# Patient Record
Sex: FEMALE | Race: BLACK OR AFRICAN AMERICAN | NOT HISPANIC OR LATINO | Employment: UNEMPLOYED | URBAN - METROPOLITAN AREA
[De-identification: names, ages, dates, MRNs, and addresses within clinical notes are randomized per-mention and may not be internally consistent; named-entity substitution may affect disease eponyms.]

---
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
## 2025-07-01 ENCOUNTER — HOSPITAL ENCOUNTER (INPATIENT)
Facility: OTHER | Age: 25
LOS: 4 days | Discharge: HOME HEALTH | DRG: 747 | End: 2025-07-06
Attending: EMERGENCY MEDICINE | Admitting: STUDENT IN AN ORGANIZED HEALTH CARE EDUCATION/TRAINING PROGRAM
Payer: COMMERCIAL

## 2025-07-01 ENCOUNTER — ANESTHESIA EVENT (OUTPATIENT)
Dept: SURGERY | Facility: OTHER | Age: 25
End: 2025-07-01
Payer: COMMERCIAL

## 2025-07-01 ENCOUNTER — ANESTHESIA (OUTPATIENT)
Dept: SURGERY | Facility: OTHER | Age: 25
End: 2025-07-01
Payer: COMMERCIAL

## 2025-07-01 DIAGNOSIS — N76.4 ABSCESS OF LABIA MAJORA: ICD-10-CM

## 2025-07-01 DIAGNOSIS — N76.4 VULVAR ABSCESS: Primary | ICD-10-CM

## 2025-07-01 DIAGNOSIS — R00.0 TACHYCARDIA: ICD-10-CM

## 2025-07-01 DIAGNOSIS — N76.4 VULVAR ABSCESS: ICD-10-CM

## 2025-07-01 DIAGNOSIS — N76.2 VULVAR CELLULITIS: ICD-10-CM

## 2025-07-01 PROBLEM — F39 MOOD DISORDER: Status: ACTIVE | Noted: 2025-07-01

## 2025-07-01 LAB
ABORH RETYPE: NORMAL
ABSOLUTE EOSINOPHIL (OHS): 0 K/UL
ABSOLUTE MONOCYTE (OHS): 0.95 K/UL (ref 0.3–1)
ABSOLUTE NEUTROPHIL COUNT (OHS): 12.13 K/UL (ref 1.8–7.7)
ALBUMIN SERPL BCP-MCNC: 3.7 G/DL (ref 3.5–5.2)
ALP SERPL-CCNC: 56 UNIT/L (ref 40–150)
ALT SERPL W/O P-5'-P-CCNC: 13 UNIT/L (ref 10–44)
ANION GAP (OHS): 11 MMOL/L (ref 8–16)
AST SERPL-CCNC: 18 UNIT/L (ref 11–45)
BASOPHILS # BLD AUTO: 0.02 K/UL
BASOPHILS NFR BLD AUTO: 0.1 %
BILIRUB SERPL-MCNC: 0.5 MG/DL (ref 0.1–1)
BUN SERPL-MCNC: 8 MG/DL (ref 6–20)
CALCIUM SERPL-MCNC: 9.8 MG/DL (ref 8.7–10.5)
CHLORIDE SERPL-SCNC: 101 MMOL/L (ref 95–110)
CO2 SERPL-SCNC: 22 MMOL/L (ref 23–29)
CREAT SERPL-MCNC: 0.8 MG/DL (ref 0.5–1.4)
ERYTHROCYTE [DISTWIDTH] IN BLOOD BY AUTOMATED COUNT: 12.5 % (ref 11.5–14.5)
GFR SERPLBLD CREATININE-BSD FMLA CKD-EPI: >60 ML/MIN/1.73/M2
GLUCOSE SERPL-MCNC: 92 MG/DL (ref 70–110)
HCT VFR BLD AUTO: 41.4 % (ref 37–48.5)
HCV AB SERPL QL IA: NEGATIVE
HGB BLD-MCNC: 13.7 GM/DL (ref 12–16)
HIV 1+2 AB+HIV1 P24 AG SERPL QL IA: NEGATIVE
HOLD SPECIMEN: 1
IMM GRANULOCYTES # BLD AUTO: 0.07 K/UL (ref 0–0.04)
IMM GRANULOCYTES NFR BLD AUTO: 0.5 % (ref 0–0.5)
INDIRECT COOMBS: NORMAL
LACTATE SERPL-SCNC: 1.6 MMOL/L (ref 0.5–2.2)
LYMPHOCYTES # BLD AUTO: 0.84 K/UL (ref 1–4.8)
MCH RBC QN AUTO: 30.6 PG (ref 27–31)
MCHC RBC AUTO-ENTMCNC: 33.1 G/DL (ref 32–36)
MCV RBC AUTO: 93 FL (ref 82–98)
NUCLEATED RBC (/100WBC) (OHS): 0 /100 WBC
PLATELET # BLD AUTO: 150 K/UL (ref 150–450)
PMV BLD AUTO: 9.2 FL (ref 9.2–12.9)
POTASSIUM SERPL-SCNC: 4.1 MMOL/L (ref 3.5–5.1)
PROT SERPL-MCNC: 7.8 GM/DL (ref 6–8.4)
RBC # BLD AUTO: 4.47 M/UL (ref 4–5.4)
RELATIVE EOSINOPHIL (OHS): 0 %
RELATIVE LYMPHOCYTE (OHS): 6 % (ref 18–48)
RELATIVE MONOCYTE (OHS): 6.8 % (ref 4–15)
RELATIVE NEUTROPHIL (OHS): 86.6 % (ref 38–73)
RH BLD: NORMAL
SODIUM SERPL-SCNC: 134 MMOL/L (ref 136–145)
SPECIMEN OUTDATE: NORMAL
WBC # BLD AUTO: 14.01 K/UL (ref 3.9–12.7)

## 2025-07-01 PROCEDURE — 0U9M0ZZ DRAINAGE OF VULVA, OPEN APPROACH: ICD-10-PCS | Performed by: STUDENT IN AN ORGANIZED HEALTH CARE EDUCATION/TRAINING PROGRAM

## 2025-07-01 PROCEDURE — 63600175 PHARM REV CODE 636 W HCPCS: Performed by: NURSE PRACTITIONER

## 2025-07-01 PROCEDURE — 25000003 PHARM REV CODE 250

## 2025-07-01 PROCEDURE — G0378 HOSPITAL OBSERVATION PER HR: HCPCS

## 2025-07-01 PROCEDURE — 81515 NFCT DS BV&VAGINITIS DNA ALG: CPT | Performed by: NURSE PRACTITIONER

## 2025-07-01 PROCEDURE — 37000009 HC ANESTHESIA EA ADD 15 MINS: Performed by: STUDENT IN AN ORGANIZED HEALTH CARE EDUCATION/TRAINING PROGRAM

## 2025-07-01 PROCEDURE — 96365 THER/PROPH/DIAG IV INF INIT: CPT

## 2025-07-01 PROCEDURE — 25000003 PHARM REV CODE 250: Performed by: ANESTHESIOLOGY

## 2025-07-01 PROCEDURE — 85025 COMPLETE CBC W/AUTO DIFF WBC: CPT | Performed by: NURSE PRACTITIONER

## 2025-07-01 PROCEDURE — 87491 CHLMYD TRACH DNA AMP PROBE: CPT | Performed by: NURSE PRACTITIONER

## 2025-07-01 PROCEDURE — 87389 HIV-1 AG W/HIV-1&-2 AB AG IA: CPT | Performed by: EMERGENCY MEDICINE

## 2025-07-01 PROCEDURE — 63600175 PHARM REV CODE 636 W HCPCS: Performed by: ANESTHESIOLOGY

## 2025-07-01 PROCEDURE — 71000033 HC RECOVERY, INTIAL HOUR: Performed by: STUDENT IN AN ORGANIZED HEALTH CARE EDUCATION/TRAINING PROGRAM

## 2025-07-01 PROCEDURE — 87075 CULTR BACTERIA EXCEPT BLOOD: CPT | Performed by: STUDENT IN AN ORGANIZED HEALTH CARE EDUCATION/TRAINING PROGRAM

## 2025-07-01 PROCEDURE — 87070 CULTURE OTHR SPECIMN AEROBIC: CPT | Performed by: STUDENT IN AN ORGANIZED HEALTH CARE EDUCATION/TRAINING PROGRAM

## 2025-07-01 PROCEDURE — 71000039 HC RECOVERY, EACH ADD'L HOUR: Performed by: STUDENT IN AN ORGANIZED HEALTH CARE EDUCATION/TRAINING PROGRAM

## 2025-07-01 PROCEDURE — 96375 TX/PRO/DX INJ NEW DRUG ADDON: CPT

## 2025-07-01 PROCEDURE — 99223 1ST HOSP IP/OBS HIGH 75: CPT | Mod: 57,25,, | Performed by: STUDENT IN AN ORGANIZED HEALTH CARE EDUCATION/TRAINING PROGRAM

## 2025-07-01 PROCEDURE — 63600175 PHARM REV CODE 636 W HCPCS: Performed by: NURSE ANESTHETIST, CERTIFIED REGISTERED

## 2025-07-01 PROCEDURE — 86803 HEPATITIS C AB TEST: CPT | Performed by: EMERGENCY MEDICINE

## 2025-07-01 PROCEDURE — 86901 BLOOD TYPING SEROLOGIC RH(D): CPT | Performed by: NURSE PRACTITIONER

## 2025-07-01 PROCEDURE — 25000003 PHARM REV CODE 250: Performed by: NURSE PRACTITIONER

## 2025-07-01 PROCEDURE — 87040 BLOOD CULTURE FOR BACTERIA: CPT | Performed by: NURSE PRACTITIONER

## 2025-07-01 PROCEDURE — 25000003 PHARM REV CODE 250: Performed by: NURSE ANESTHETIST, CERTIFIED REGISTERED

## 2025-07-01 PROCEDURE — 36000704 HC OR TIME LEV I 1ST 15 MIN: Performed by: STUDENT IN AN ORGANIZED HEALTH CARE EDUCATION/TRAINING PROGRAM

## 2025-07-01 PROCEDURE — 63600175 PHARM REV CODE 636 W HCPCS

## 2025-07-01 PROCEDURE — 83605 ASSAY OF LACTIC ACID: CPT | Performed by: NURSE PRACTITIONER

## 2025-07-01 PROCEDURE — 25000003 PHARM REV CODE 250: Performed by: STUDENT IN AN ORGANIZED HEALTH CARE EDUCATION/TRAINING PROGRAM

## 2025-07-01 PROCEDURE — 37000008 HC ANESTHESIA 1ST 15 MINUTES: Performed by: STUDENT IN AN ORGANIZED HEALTH CARE EDUCATION/TRAINING PROGRAM

## 2025-07-01 PROCEDURE — 25500020 PHARM REV CODE 255: Performed by: EMERGENCY MEDICINE

## 2025-07-01 PROCEDURE — 99285 EMERGENCY DEPT VISIT HI MDM: CPT | Mod: 25

## 2025-07-01 PROCEDURE — 63600175 PHARM REV CODE 636 W HCPCS: Performed by: STUDENT IN AN ORGANIZED HEALTH CARE EDUCATION/TRAINING PROGRAM

## 2025-07-01 PROCEDURE — 82947 ASSAY GLUCOSE BLOOD QUANT: CPT | Performed by: NURSE PRACTITIONER

## 2025-07-01 PROCEDURE — 56405 I&D VULVA/PERINEAL ABSCESS: CPT | Mod: ,,, | Performed by: STUDENT IN AN ORGANIZED HEALTH CARE EDUCATION/TRAINING PROGRAM

## 2025-07-01 PROCEDURE — 36000705 HC OR TIME LEV I EA ADD 15 MIN: Performed by: STUDENT IN AN ORGANIZED HEALTH CARE EDUCATION/TRAINING PROGRAM

## 2025-07-01 RX ORDER — DEXMEDETOMIDINE HYDROCHLORIDE 100 UG/ML
INJECTION, SOLUTION INTRAVENOUS
Status: DISCONTINUED | OUTPATIENT
Start: 2025-07-01 | End: 2025-07-01

## 2025-07-01 RX ORDER — ACETAMINOPHEN 10 MG/ML
INJECTION, SOLUTION INTRAVENOUS
Status: DISCONTINUED | OUTPATIENT
Start: 2025-07-01 | End: 2025-07-01

## 2025-07-01 RX ORDER — LIDOCAINE HYDROCHLORIDE 20 MG/ML
INJECTION INTRAVENOUS
Status: DISCONTINUED | OUTPATIENT
Start: 2025-07-01 | End: 2025-07-01

## 2025-07-01 RX ORDER — LURASIDONE HYDROCHLORIDE 40 MG/1
40 TABLET, FILM COATED ORAL DAILY
COMMUNITY

## 2025-07-01 RX ORDER — SODIUM CHLORIDE 0.9 % (FLUSH) 0.9 %
10 SYRINGE (ML) INJECTION
Status: DISCONTINUED | OUTPATIENT
Start: 2025-07-01 | End: 2025-07-01

## 2025-07-01 RX ORDER — PROCHLORPERAZINE EDISYLATE 5 MG/ML
5 INJECTION INTRAMUSCULAR; INTRAVENOUS EVERY 30 MIN PRN
Status: DISCONTINUED | OUTPATIENT
Start: 2025-07-01 | End: 2025-07-01 | Stop reason: HOSPADM

## 2025-07-01 RX ORDER — ONDANSETRON 8 MG/1
8 TABLET, ORALLY DISINTEGRATING ORAL EVERY 8 HOURS PRN
Status: DISCONTINUED | OUTPATIENT
Start: 2025-07-01 | End: 2025-07-01

## 2025-07-01 RX ORDER — MUPIROCIN 20 MG/G
OINTMENT TOPICAL
Status: CANCELLED | OUTPATIENT
Start: 2025-07-01

## 2025-07-01 RX ORDER — ONDANSETRON HYDROCHLORIDE 2 MG/ML
4 INJECTION, SOLUTION INTRAVENOUS DAILY PRN
Status: DISCONTINUED | OUTPATIENT
Start: 2025-07-01 | End: 2025-07-01 | Stop reason: HOSPADM

## 2025-07-01 RX ORDER — PROPOFOL 10 MG/ML
VIAL (ML) INTRAVENOUS
Status: DISCONTINUED | OUTPATIENT
Start: 2025-07-01 | End: 2025-07-01

## 2025-07-01 RX ORDER — ACETAMINOPHEN 325 MG/1
650 TABLET ORAL EVERY 4 HOURS PRN
Status: DISCONTINUED | OUTPATIENT
Start: 2025-07-01 | End: 2025-07-02

## 2025-07-01 RX ORDER — BUSPIRONE HYDROCHLORIDE 10 MG/1
10 TABLET ORAL 3 TIMES DAILY
COMMUNITY

## 2025-07-01 RX ORDER — GLUCAGON 1 MG
1 KIT INJECTION
Status: DISCONTINUED | OUTPATIENT
Start: 2025-07-01 | End: 2025-07-01 | Stop reason: HOSPADM

## 2025-07-01 RX ORDER — BUSPIRONE HYDROCHLORIDE 10 MG/1
10 TABLET ORAL 3 TIMES DAILY
Status: DISCONTINUED | OUTPATIENT
Start: 2025-07-01 | End: 2025-07-06 | Stop reason: HOSPADM

## 2025-07-01 RX ORDER — HYDROMORPHONE HYDROCHLORIDE 2 MG/ML
0.4 INJECTION, SOLUTION INTRAMUSCULAR; INTRAVENOUS; SUBCUTANEOUS EVERY 5 MIN PRN
Status: DISCONTINUED | OUTPATIENT
Start: 2025-07-01 | End: 2025-07-01 | Stop reason: HOSPADM

## 2025-07-01 RX ORDER — FENTANYL CITRATE 50 UG/ML
INJECTION, SOLUTION INTRAMUSCULAR; INTRAVENOUS
Status: DISCONTINUED | OUTPATIENT
Start: 2025-07-01 | End: 2025-07-01

## 2025-07-01 RX ORDER — OXYCODONE HYDROCHLORIDE 5 MG/1
5 TABLET ORAL EVERY 4 HOURS PRN
Refills: 0 | Status: DISCONTINUED | OUTPATIENT
Start: 2025-07-01 | End: 2025-07-06 | Stop reason: HOSPADM

## 2025-07-01 RX ORDER — DEXTROAMPHETAMINE SACCHARATE, AMPHETAMINE ASPARTATE, DEXTROAMPHETAMINE SULFATE AND AMPHETAMINE SULFATE 2.5; 2.5; 2.5; 2.5 MG/1; MG/1; MG/1; MG/1
1 TABLET ORAL NIGHTLY
COMMUNITY

## 2025-07-01 RX ORDER — HYDROMORPHONE HYDROCHLORIDE 1 MG/ML
0.5 INJECTION, SOLUTION INTRAMUSCULAR; INTRAVENOUS; SUBCUTANEOUS ONCE
Refills: 0 | Status: COMPLETED | OUTPATIENT
Start: 2025-07-01 | End: 2025-07-01

## 2025-07-01 RX ORDER — LURASIDONE HYDROCHLORIDE 20 MG/1
40 TABLET, FILM COATED ORAL DAILY
Status: DISCONTINUED | OUTPATIENT
Start: 2025-07-01 | End: 2025-07-06 | Stop reason: HOSPADM

## 2025-07-01 RX ORDER — MORPHINE SULFATE 2 MG/ML
2 INJECTION, SOLUTION INTRAMUSCULAR; INTRAVENOUS
Status: COMPLETED | OUTPATIENT
Start: 2025-07-01 | End: 2025-07-01

## 2025-07-01 RX ORDER — SODIUM CHLORIDE 0.9 % (FLUSH) 0.9 %
10 SYRINGE (ML) INJECTION
Status: DISCONTINUED | OUTPATIENT
Start: 2025-07-01 | End: 2025-07-06 | Stop reason: HOSPADM

## 2025-07-01 RX ORDER — SODIUM CHLORIDE 0.9 % (FLUSH) 0.9 %
2 SYRINGE (ML) INJECTION ONCE
Status: DISCONTINUED | OUTPATIENT
Start: 2025-07-01 | End: 2025-07-01 | Stop reason: HOSPADM

## 2025-07-01 RX ORDER — LAMOTRIGINE 200 MG/1
200 TABLET ORAL DAILY
COMMUNITY

## 2025-07-01 RX ORDER — OXYCODONE HYDROCHLORIDE 5 MG/1
5 TABLET ORAL
Status: DISCONTINUED | OUTPATIENT
Start: 2025-07-01 | End: 2025-07-01 | Stop reason: HOSPADM

## 2025-07-01 RX ORDER — DEXAMETHASONE SODIUM PHOSPHATE 4 MG/ML
INJECTION, SOLUTION INTRA-ARTICULAR; INTRALESIONAL; INTRAMUSCULAR; INTRAVENOUS; SOFT TISSUE
Status: DISCONTINUED | OUTPATIENT
Start: 2025-07-01 | End: 2025-07-01

## 2025-07-01 RX ORDER — PROCHLORPERAZINE EDISYLATE 5 MG/ML
5 INJECTION INTRAMUSCULAR; INTRAVENOUS EVERY 6 HOURS PRN
Status: DISCONTINUED | OUTPATIENT
Start: 2025-07-01 | End: 2025-07-06 | Stop reason: HOSPADM

## 2025-07-01 RX ORDER — HYDROMORPHONE HYDROCHLORIDE 1 MG/ML
1 INJECTION, SOLUTION INTRAMUSCULAR; INTRAVENOUS; SUBCUTANEOUS EVERY 4 HOURS PRN
Refills: 0 | Status: DISCONTINUED | OUTPATIENT
Start: 2025-07-01 | End: 2025-07-01

## 2025-07-01 RX ORDER — DEXTROAMPHETAMINE SACCHARATE, AMPHETAMINE ASPARTATE, DEXTROAMPHETAMINE SULFATE AND AMPHETAMINE SULFATE 3.75; 3.75; 3.75; 3.75 MG/1; MG/1; MG/1; MG/1
1 TABLET ORAL EVERY MORNING
COMMUNITY

## 2025-07-01 RX ORDER — FAMOTIDINE 20 MG/1
20 TABLET, FILM COATED ORAL
Status: DISCONTINUED | OUTPATIENT
Start: 2025-07-01 | End: 2025-07-03

## 2025-07-01 RX ORDER — ONDANSETRON HYDROCHLORIDE 2 MG/ML
INJECTION, SOLUTION INTRAVENOUS
Status: DISCONTINUED | OUTPATIENT
Start: 2025-07-01 | End: 2025-07-01

## 2025-07-01 RX ORDER — PHENYLEPHRINE HYDROCHLORIDE 10 MG/ML
INJECTION INTRAVENOUS
Status: DISCONTINUED | OUTPATIENT
Start: 2025-07-01 | End: 2025-07-01

## 2025-07-01 RX ORDER — LAMOTRIGINE 100 MG/1
200 TABLET ORAL DAILY
Status: DISCONTINUED | OUTPATIENT
Start: 2025-07-01 | End: 2025-07-06 | Stop reason: HOSPADM

## 2025-07-01 RX ORDER — SODIUM CHLORIDE 9 MG/ML
INJECTION, SOLUTION INTRAVENOUS CONTINUOUS
Status: CANCELLED | OUTPATIENT
Start: 2025-07-01

## 2025-07-01 RX ORDER — HYDROMORPHONE HYDROCHLORIDE 2 MG/ML
0.5 INJECTION, SOLUTION INTRAMUSCULAR; INTRAVENOUS; SUBCUTANEOUS
Status: DISCONTINUED | OUTPATIENT
Start: 2025-07-01 | End: 2025-07-02

## 2025-07-01 RX ADMIN — LAMOTRIGINE 200 MG: 100 TABLET ORAL at 02:07

## 2025-07-01 RX ADMIN — PHENYLEPHRINE HYDROCHLORIDE 100 MCG: 10 INJECTION INTRAVENOUS at 04:07

## 2025-07-01 RX ADMIN — OXYCODONE HYDROCHLORIDE 5 MG: 5 TABLET ORAL at 09:07

## 2025-07-01 RX ADMIN — VANCOMYCIN HYDROCHLORIDE 1500 MG: 1.5 INJECTION, POWDER, LYOPHILIZED, FOR SOLUTION INTRAVENOUS at 06:07

## 2025-07-01 RX ADMIN — FENTANYL CITRATE 100 MCG: 50 INJECTION, SOLUTION INTRAMUSCULAR; INTRAVENOUS at 04:07

## 2025-07-01 RX ADMIN — ONDANSETRON HYDROCHLORIDE 4 MG: 2 INJECTION INTRAMUSCULAR; INTRAVENOUS at 04:07

## 2025-07-01 RX ADMIN — DEXMEDETOMIDINE HYDROCHLORIDE 12 MCG: 100 INJECTION, SOLUTION, CONCENTRATE INTRAVENOUS at 04:07

## 2025-07-01 RX ADMIN — BUSPIRONE HYDROCHLORIDE 10 MG: 10 TABLET ORAL at 02:07

## 2025-07-01 RX ADMIN — SODIUM CHLORIDE 1000 ML: 9 INJECTION, SOLUTION INTRAVENOUS at 01:07

## 2025-07-01 RX ADMIN — HYDROMORPHONE HYDROCHLORIDE 0.5 MG: 1 INJECTION, SOLUTION INTRAMUSCULAR; INTRAVENOUS; SUBCUTANEOUS at 01:07

## 2025-07-01 RX ADMIN — HYDROMORPHONE HYDROCHLORIDE 0.4 MG: 2 INJECTION INTRAMUSCULAR; INTRAVENOUS; SUBCUTANEOUS at 05:07

## 2025-07-01 RX ADMIN — OXYCODONE HYDROCHLORIDE 5 MG: 5 TABLET ORAL at 05:07

## 2025-07-01 RX ADMIN — PIPERACILLIN SODIUM AND TAZOBACTAM SODIUM 4.5 G: 4; .5 INJECTION, POWDER, FOR SOLUTION INTRAVENOUS at 12:07

## 2025-07-01 RX ADMIN — PHENYLEPHRINE HYDROCHLORIDE 100 MCG: 10 INJECTION INTRAVENOUS at 05:07

## 2025-07-01 RX ADMIN — SODIUM CHLORIDE, SODIUM LACTATE, POTASSIUM CHLORIDE, AND CALCIUM CHLORIDE: .6; .31; .03; .02 INJECTION, SOLUTION INTRAVENOUS at 04:07

## 2025-07-01 RX ADMIN — IOHEXOL 75 ML: 350 INJECTION, SOLUTION INTRAVENOUS at 12:07

## 2025-07-01 RX ADMIN — OXYCODONE HYDROCHLORIDE 5 MG: 5 TABLET ORAL at 02:07

## 2025-07-01 RX ADMIN — FENTANYL CITRATE 50 MCG: 50 INJECTION, SOLUTION INTRAMUSCULAR; INTRAVENOUS at 04:07

## 2025-07-01 RX ADMIN — MORPHINE SULFATE 2 MG: 2 INJECTION, SOLUTION INTRAMUSCULAR; INTRAVENOUS at 12:07

## 2025-07-01 RX ADMIN — CARBOXYMETHYLCELLULOSE SODIUM 2 DROP: 2.5 SOLUTION/ DROPS OPHTHALMIC at 04:07

## 2025-07-01 RX ADMIN — PROPOFOL 200 MG: 10 INJECTION, EMULSION INTRAVENOUS at 04:07

## 2025-07-01 RX ADMIN — LIDOCAINE HYDROCHLORIDE 100 MG: 20 INJECTION, SOLUTION INTRAVENOUS at 04:07

## 2025-07-01 RX ADMIN — BUSPIRONE HYDROCHLORIDE 10 MG: 10 TABLET ORAL at 08:07

## 2025-07-01 RX ADMIN — ACETAMINOPHEN 1000 MG: 10 INJECTION INTRAVENOUS at 04:07

## 2025-07-01 RX ADMIN — FENTANYL CITRATE 50 MCG: 50 INJECTION, SOLUTION INTRAMUSCULAR; INTRAVENOUS at 05:07

## 2025-07-01 RX ADMIN — PIPERACILLIN SODIUM AND TAZOBACTAM SODIUM 4.5 G: 4; .5 INJECTION, POWDER, FOR SOLUTION INTRAVENOUS at 08:07

## 2025-07-01 RX ADMIN — DEXAMETHASONE SODIUM PHOSPHATE 8 MG: 4 INJECTION, SOLUTION INTRAMUSCULAR; INTRAVENOUS at 04:07

## 2025-07-01 NOTE — ANESTHESIA PREPROCEDURE EVALUATION
07/01/2025  Perla Mallory is a 24 y.o., female.      Pre-op Assessment    I have reviewed the Patient Summary Reports.     I have reviewed the Nursing Notes. I have reviewed the NPO Status.   I have reviewed the Medications.     Review of Systems  Anesthesia Hx:  No problems with previous Anesthesia                Cardiovascular:  Exercise tolerance: good                                                 Physical Exam  General: Well nourished, Cooperative and Alert    Airway:  Mallampati: I     Dental:  Intact      Anesthesia Plan  Type of Anesthesia, risks & benefits discussed:    Anesthesia Type: Gen Supraglottic Airway  Intra-op Monitoring Plan: Standard ASA Monitors  Post Op Pain Control Plan: IV/PO Opioids PRN  Induction:  IV  Airway Plan: Video, Post-Induction  Informed Consent: Informed consent signed with the Patient and all parties understand the risks and agree with anesthesia plan.  All questions answered.   ASA Score: 2 Emergent  Anesthesia Plan Notes: UPT neg    Ready For Surgery From Anesthesia Perspective.     .

## 2025-07-01 NOTE — ANESTHESIA POSTPROCEDURE EVALUATION
Anesthesia Post Evaluation    Patient: Perla Mallory    Procedure(s) Performed: Procedure(s) (LRB):  INCISION AND DRAINAGE, LABIA (Right)    Final Anesthesia Type: general      Patient location during evaluation: PACU  Patient participation: Yes- Able to Participate  Level of consciousness: awake and alert  Post-procedure vital signs: reviewed and stable  Pain management: adequate  Airway patency: patent    PONV status at discharge: No PONV  Anesthetic complications: no      Cardiovascular status: blood pressure returned to baseline  Respiratory status: unassisted  Hydration status: euvolemic  Follow-up not needed.          Vitals Value Taken Time   /65 07/01/25 18:16   Temp 37.2 °C (99 °F) 07/01/25 18:16   Pulse 100 07/01/25 18:16   Resp 16 07/01/25 18:16   SpO2 100 % 07/01/25 18:16         Event Time   Out of Recovery 18:04:01         Pain/Brenda Score: Pain Rating Prior to Med Admin: 7 (7/1/2025  5:50 PM)  Pain Rating Post Med Admin: 7 (7/1/2025  6:00 PM)  Brenda Score: 9 (7/1/2025  5:18 PM)

## 2025-07-01 NOTE — CONSULTS
[facility] Emergency Dept  Obstetrics & Gynecology  Consult Note    Patient Name: Perla Mallory  MRN: [id number]  Admission Date: 7/1/2025  Hospital Length of Stay: 0 days  Code Status: No Order  Primary Care Provider: Mya Qureshi MD (Inactive)  Principal Problem: <principal problem not specified>    Inpatient consult to Gynecology  Consult performed by: Adela Griffith MD  Consult ordered by: Madeleine Nugent MD        Subjective:     Chief Complaint: Vulvar abscess    History of Present Illness: Ms. Mallory is a 24F presenting with pain and swelling in right groin. She reports concern for worsening ingrown hair as she historically gets ingrown hairs with shaving bikini area. She first noticed swelling on Saturday which worsened Sunday after she drove from [place] to [place]. She had some abnormal vaginal discharge and was concerned for a yeast infection, but the discharge improved while the abscess worsened. She used ibuprofen and hot compresses to help with the associated pain. She has been cleaning the area with an alcohol pad but reports worsening in status since this weekend. Today, she endorses severe right inguinal, right labial, and right lower quadrant pain. She is sexually active and has an IUD in place. No hx of STIs. Reports nausea, denies vomiting. Minimal appetite. Denies fever/chills. Endorses no issues with voiding or BM. Last ate breakfast at 0830.     No current facility-administered medications on file prior to encounter.     Current Outpatient Medications on File Prior to Encounter   Medication Sig    busPIRone (BUSPAR) 10 MG tablet Take 10 mg by mouth 3 (three) times daily.    dextroamphetamine-amphetamine (ADDERALL) 10 mg Tab Take 1 tablet by mouth every evening.    dextroamphetamine-amphetamine (ADDERALL) 15 mg tablet Take 1 tablet by mouth every morning.    lamoTRIgine (LAMICTAL) 200 MG tablet Take 200 mg by mouth once daily.    levonorgestreL (MIRENA) 52 mg IUD 52,000 mcg by  Intrauterine route.    lurasidone (LATUDA) 40 mg Tab tablet Take 40 mg by mouth once daily.    benzonatate (TESSALON) 200 MG capsule Take 1 capsule (200 mg total) by mouth 3 (three) times daily as needed for Cough.    hydrocortisone 2.5 % cream Apply topically 3 (three) times daily.    predniSONE (DELTASONE) 20 MG tablet Take 2 tablets daily for 3 days (Patient not taking: Reported on 10/25/2020)       Review of patient's allergies indicates:  No Known Allergies    History reviewed. No pertinent past medical history.  OB History   No obstetric history on file.     History reviewed. No pertinent surgical history.  Family History       Problem Relation (Age of Onset)    Hypertension Father    Stroke Father          Tobacco Use    Smoking status: Never    Smokeless tobacco: Never   Substance and Sexual Activity    Alcohol use: No    Drug use: No    Sexual activity: Never     Review of Systems   Constitutional:  Negative for chills, fatigue and fever.   Gastrointestinal:  Positive for abdominal pain and nausea. Negative for constipation, diarrhea and vomiting.   Genitourinary:  Positive for genital sores, pelvic pain, vaginal discharge and vaginal pain. Negative for dysuria, hematuria, vaginal bleeding and vaginal dryness.   Hematological:  Positive for adenopathy.     Objective:     Vital Signs (Most Recent):  Temp: 98.7 °F (37.1 °C) (07/01/25 1022)  Pulse: (!) 112 (07/01/25 1022)  Resp: 18 (07/01/25 1209)  BP: 106/68 (07/01/25 1022)  SpO2: 100 % (07/01/25 1022) Vital Signs (24h Range):  Temp:  [98.7 °F (37.1 °C)] 98.7 °F (37.1 °C)  Pulse:  [112] 112  Resp:  [18] 18  SpO2:  [100 %] 100 %  BP: (106)/(68) 106/68     Weight: 61.2 kg (135 lb)  Body mass index is 23.91 kg/m².  No LMP recorded.    Physical Exam:   Constitutional: She appears well-developed and well-nourished. No distress.    HENT:   Head: Normocephalic and atraumatic.      Cardiovascular:  Regular rhythm.            Mildly elevated HR to 112     Pulmonary/Chest: Effort normal.        Abdominal: Soft. She exhibits no distension.     Genitourinary: No vaginal discharge in the vagina.    Genitourinary Comments: 7cm right labial abscess with induration to right inguinal nodes and ASIS. No crepitus. Severe tenderness to palpation. Right inner labial mucosa soft with no   No edema or nodules palpated on Bartholin gland. Refer to media below.                   Skin: Skin is warm and dry. She is not diaphoretic.    Psychiatric: She has a normal mood and affect. Her behavior is normal. Judgment and thought content normal.         Laboratory:  CBC:   Recent Labs   Lab 07/01/25  1110   WBC 14.01*   RBC 4.47   HGB 13.7   HCT 41.4      MCV 93   MCH 30.6   MCHC 33.1     CMP:   Recent Labs   Lab 07/01/25  1110   GLU 92   CALCIUM 9.8   ALBUMIN 3.7   PROT 7.8   *   K 4.1   CO2 22*      BUN 8   CREATININE 0.8   ALKPHOS 56   ALT 13   AST 18   BILITOT 0.5       Diagnostic Results:  Imaging Results              CT Abdomen Pelvis With IV Contrast NO Oral Contrast (Final result)  Result time 07/01/25 12:38:35      Final result by Stephen Triplett MD (07/01/25 12:38:35)                   Impression:      Partially imaged 2.0 cm subcutaneous cystic lesion in the right labia majora with extensive surrounding edema, concerning for abscess as clinically suspected.    Heterogeneous myometrial enhancement, which may be physiologic.  Other considerations include fibroids and adenomyosis.  Consider pelvic ultrasound.    Other findings as described.      Electronically signed by: Stephen Triplett  Date:    07/01/2025  Time:    12:38               Narrative:    EXAMINATION:  CT ABDOMEN PELVIS WITH IV CONTRAST    CLINICAL HISTORY:  Abdominal abscess/infection suspected;    TECHNIQUE:  Low dose axial images, sagittal and coronal reformations were obtained from the lung bases to the pubic symphysis following the IV administration of 75 mL of Omnipaque 350 .  Oral contrast was  not given.    COMPARISON:  None.    FINDINGS:  LUNG BASES: Unremarkable.    HEPATOBILIARY: Focal fat along the fissure for the ligamentum teres.  Normal gallbladder.  No bile duct dilatation.    SPLEEN: No splenomegaly.    PANCREAS: No focal masses or ductal dilatation.    ADRENALS: No adrenal nodules.    KIDNEYS/URETERS: No hydronephrosis, stones, or solid mass lesions.    BLADDER/PELVIC ORGANS: Bladder is decompressed.  Heterogeneous myometrial enhancement.  IUD in the endometrial cavity.  No adnexal mass.    PERITONEUM / RETROPERITONEUM: No free air or fluid.    LYMPH NODES: Enlarged right inguinal lymph nodes measuring up to 1.2 cm, likely reactive.    VESSELS: Unremarkable.    GI TRACT: No distention or wall thickening. Normal appendix.    SOFT TISSUES: Partially imaged 2.0 x 1.3 cm subcutaneous peripherally enhancing cystic lesion in the right labia majora with extensive surrounding edema (2:183).    BONES: Incomplete fusion of the posterior arches from L4-S1.  No acute fracture.  No focal osseous lesion.                                       Assessment/Plan:   Ms. Mallory is a 24F presenting with worsening right vulvar abscess since Saturday.     Right vulvar abscess  - VSS  - Afebrile, mildly tachycardic to 112  - Worsening vulvar edema since Saturday  - Physical exam notable for right vulvar abscess with edema and induration to ASIS  - CTAP notable for right labial abscess and inguinal node enlargement without involvement of bone  - Continue IV Zosyn for 24-48h with plan to transition to PO at discharge  - For incision and drainage in OR once NPO for 8h; GYN holding consents  - Anticipate intra-op cultures    Discussed with GYN staff and in agreement.     Thank you for your consult. I will follow-up with patient. Please contact us if you have any additional questions.    Adela Griffith MD  Obstetrics & Gynecology  PGY-1

## 2025-07-01 NOTE — ED TRIAGE NOTES
Pt reporting a large abscess to right groin and labia. States it began on Sunday as an ingrown hair.

## 2025-07-01 NOTE — H&P
[facility] Emergency Dept  Obstetrics & Gynecology  History & Physical    Patient Name: Perla Mallory  MRN: [id number]  Admission Date: 7/1/2025  Primary Care Provider: Mya Qureshi MD (Inactive)    In brief, 25yo female with concern for right vulvar abscess. Please see consult note dated 7/1/2025 for further details and management plan.       Adela Griffith MD  Obstetrics & Gynecology  [facility] - Emergency Dept

## 2025-07-01 NOTE — ANESTHESIA PROCEDURE NOTES
LMA    Date/Time: 7/1/2025 4:32 PM    Performed by: Marge Whiting CRNA  Authorized by: Kb Morocho MD    Intubation:     Induction:  Intravenous    Mask Ventilation:  Easy mask    Attempts:  1    Attempted By:  CRNA    Difficult Airway Encountered?: No      Complications:  None    Airway Device:  Supraglottic airway/LMA    Airway Device Size:  4.0    Secured at:  The lips    Placement Verified By:  Capnometry    Complicating Factors:  None    Findings Post-Intubation:  BS equal bilateral and atraumatic/condition of teeth unchanged

## 2025-07-01 NOTE — OP NOTE
"OPERATIVE REPORT    DATE OF OPERATION: 7/1/2025    PREOPERATIVE DIAGNOSIS:  Right vulvar abscess.    POSTOPERATIVE DIAGNOSIS:  Right vulvar abscess    OPERATION PERFORMED:  Incision and drainage of right vulvar abscess    SURGEON: Kim Rizvi MD - Primary  Yonis Humphreys MD and  Adela Griffith MD - Residents assisting    ANESTHESIA:  General.    ESTIMATED BLOOD LOSS:  100 cc.    COMPLICATIONS:  None.    INDICATIONS FOR OPERATION AND OPERATIVE FINDINGS: 7 cm right vulvar abscess    DESCRIPTION OF OPERATION:  The patient was brought to the operating room in stable condition and placed on the operating room table in the dorsal supine position. The patient was then administered general anesthesia without difficulty. The patient was then repositioned in the dorsal lithotomy position using yellow fin stirrups. Exam under anesthesia was performed. A large right vulvar abscess was noted. The patient was then perineally prepped and draped in the usual sterile fashion.     With the use of an 15 blade, an incision was made at the midline of abscess, approximately 2-3 cm, starting at what appeared to be the head of abscess and extending posteriorly. This allowed the purulent material to exit the cavity. Cultures were taken of this material. Blunt dissection was used to break up loculations. Loculations extended 7 cm cephalad, 4 cm medially, and 3 cm inferiorly. Suction was used to thoroughly evacuate any purulent material. The cavity was then copiously irrigated with sterile saline and scrubbed with chlorhexidine prep sponge. Bovie cautery was used to ensure adequate hemostasis. Collapsed septations were then packed with 1/2" iodoform leaving a 3" tail and covered by a single 4 x 4 sterile gauze.     Sponge and instrument correct was correct x 2. The patient was transferred to the stretcher and taken to the postanesthesia recovery unit in good condition. There were no complications associated with this procedure.       Adela" MICHAEL Griffith M.D.  Obstetrics and Gynecology  PGY-1     I was present for the entire procedure, and agree with the above resident's assessment of findings and description of procedure.  Kim Rizvi M.D.

## 2025-07-01 NOTE — ED PROVIDER NOTES
Encounter Date: 7/1/2025       History     Chief Complaint   Patient presents with    Female  Problem     Pt reports she thought she has ingrown hair to groin, but states over past 2 days, it became more swollen and began draining. Reports gold-ball sized abscess. Denies fevers. Taking ibuprofen at home with minimal relief.     A pleasant 23 y/o female which presents with vaginal swelling that began on Sunday. Pt states she thought she had an ingrown hair and then it started to drain a little. Yesterday the size increased significantly and has worsened today. Pt denies fevers. Denies vaginal discharge.     The history is provided by the patient.     Review of patient's allergies indicates:  No Known Allergies  History reviewed. No pertinent past medical history.  History reviewed. No pertinent surgical history.  Family History   Problem Relation Name Age of Onset    Hypertension Father      Stroke Father       Social History[1]  Review of Systems   Constitutional:  Negative for fever.   HENT:  Negative for sore throat.    Respiratory:  Negative for shortness of breath.    Cardiovascular:  Negative for chest pain.   Gastrointestinal:  Negative for nausea.   Genitourinary:  Negative for dysuria.   Musculoskeletal:  Negative for back pain.   Skin:  Positive for color change. Negative for rash.   Neurological:  Negative for weakness.   Hematological:  Does not bruise/bleed easily.   All other systems reviewed and are negative.      Physical Exam     Initial Vitals [07/01/25 1022]   BP Pulse Resp Temp SpO2   106/68 (!) 112 18 98.7 °F (37.1 °C) 100 %      MAP       --         Physical Exam    Nursing note and vitals reviewed.  Constitutional: She appears well-developed and well-nourished.   HENT:   Head: Normocephalic and atraumatic.   Eyes: Conjunctivae and EOM are normal. Pupils are equal, round, and reactive to light.   Neck:   Normal range of motion.  Cardiovascular:  Normal rate, regular rhythm, normal heart sounds  and intact distal pulses.     Exam reveals no gallop and no friction rub.       No murmur heard.  Pulmonary/Chest: Breath sounds normal. No respiratory distress. She has no wheezes. She has no rhonchi. She has no rales. She exhibits no tenderness.   Abdominal: Abdomen is soft. Bowel sounds are normal. She exhibits no distension and no mass. There is no abdominal tenderness. There is no rebound and no guarding.   Genitourinary: There is tenderness on the right labia.    Genitourinary Comments: Large area of induration with possible abscess to right labia majora which appears to extend to deep soft tissue         Musculoskeletal:         General: No tenderness or edema. Normal range of motion.      Cervical back: Normal range of motion.     Neurological: She is alert and oriented to person, place, and time. She has normal strength. GCS score is 15. GCS eye subscore is 4. GCS verbal subscore is 5. GCS motor subscore is 6.   Skin: Skin is warm. Capillary refill takes less than 2 seconds. No rash noted. No erythema.   Psychiatric: She has a normal mood and affect.       ED Course   Critical Care    Date/Time: 7/1/2025 1:17 PM    Performed by: Yoana Martines FNP  Authorized by: Cony Escobar MD  Direct patient critical care time: 15 minutes  Additional history critical care time: 5 minutes  Ordering / reviewing critical care time: 5 minutes  Documentation critical care time: 5 minutes  Total critical care time (exclusive of procedural time) : 30 minutes  Critical care was necessary to treat or prevent imminent or life-threatening deterioration of the following conditions: sepsis.  Critical care was time spent personally by me on the following activities: evaluation of patient's response to treatment, examination of patient, obtaining history from patient or surrogate, ordering and performing treatments and interventions, ordering and review of laboratory studies, ordering and review of radiographic studies, pulse  oximetry, re-evaluation of patient's condition and review of old charts.        Labs Reviewed   COMPREHENSIVE METABOLIC PANEL - Abnormal       Result Value    Sodium 134 (*)     Potassium 4.1      Chloride 101      CO2 22 (*)     Glucose 92      BUN 8      Creatinine 0.8      Calcium 9.8      Protein Total 7.8      Albumin 3.7      Bilirubin Total 0.5      ALP 56      AST 18      ALT 13      Anion Gap 11      eGFR >60     CBC WITH DIFFERENTIAL - Abnormal    WBC 14.01 (*)     RBC 4.47      HGB 13.7      HCT 41.4      MCV 93      MCH 30.6      MCHC 33.1      RDW 12.5      Platelet Count 150      MPV 9.2      Nucleated RBC 0      Neut % 86.6 (*)     Lymph % 6.0 (*)     Mono % 6.8      Eos % 0.0      Basophil % 0.1      Imm Grans % 0.5      Neut # 12.13 (*)     Lymph # 0.84 (*)     Mono # 0.95      Eos # 0.00      Baso # 0.02      Imm Grans # 0.07 (*)    HEPATITIS C ANTIBODY - Normal    Hep C Ab Interp Negative     HIV 1 / 2 ANTIBODY - Normal    HIV 1/2 Ag/Ab Negative     LACTIC ACID, PLASMA - Normal    Lactic Acid Level 1.6      Narrative:     Falsely low lactic acid results can be found in samples containing >=13.0 mg/dL total bilirubin and/or >=3.5 mg/dL direct bilirubin.    CULTURE, BLOOD   CULTURE, BLOOD   HEP C VIRUS HOLD SPECIMEN    Extra Tube 1     CBC W/ AUTO DIFFERENTIAL    Narrative:     The following orders were created for panel order CBC auto differential.  Procedure                               Abnormality         Status                     ---------                               -----------         ------                     CBC with Differential[[id number]]       Abnormal            Final result                 Please view results for these tests on the individual orders.   C. TRACHOMATIS/N. GONORRHOEAE BY AMP DNA   VAGINOSIS SCREEN BY DNA PROBE   POCT URINE PREGNANCY   TYPE & SCREEN   ABORH RETYPE          Imaging Results              CT Abdomen Pelvis With IV Contrast NO Oral Contrast (Final result)   Result time 07/01/25 12:38:35      Final result by Stephen Triplett MD (07/01/25 12:38:35)                   Impression:      Partially imaged 2.0 cm subcutaneous cystic lesion in the right labia majora with extensive surrounding edema, concerning for abscess as clinically suspected.    Heterogeneous myometrial enhancement, which may be physiologic.  Other considerations include fibroids and adenomyosis.  Consider pelvic ultrasound.    Other findings as described.      Electronically signed by: Stephen Triplett  Date:    07/01/2025  Time:    12:38               Narrative:    EXAMINATION:  CT ABDOMEN PELVIS WITH IV CONTRAST    CLINICAL HISTORY:  Abdominal abscess/infection suspected;    TECHNIQUE:  Low dose axial images, sagittal and coronal reformations were obtained from the lung bases to the pubic symphysis following the IV administration of 75 mL of Omnipaque 350 .  Oral contrast was not given.    COMPARISON:  None.    FINDINGS:  LUNG BASES: Unremarkable.    HEPATOBILIARY: Focal fat along the fissure for the ligamentum teres.  Normal gallbladder.  No bile duct dilatation.    SPLEEN: No splenomegaly.    PANCREAS: No focal masses or ductal dilatation.    ADRENALS: No adrenal nodules.    KIDNEYS/URETERS: No hydronephrosis, stones, or solid mass lesions.    BLADDER/PELVIC ORGANS: Bladder is decompressed.  Heterogeneous myometrial enhancement.  IUD in the endometrial cavity.  No adnexal mass.    PERITONEUM / RETROPERITONEUM: No free air or fluid.    LYMPH NODES: Enlarged right inguinal lymph nodes measuring up to 1.2 cm, likely reactive.    VESSELS: Unremarkable.    GI TRACT: No distention or wall thickening. Normal appendix.    SOFT TISSUES: Partially imaged 2.0 x 1.3 cm subcutaneous peripherally enhancing cystic lesion in the right labia majora with extensive surrounding edema (2:183).    BONES: Incomplete fusion of the posterior arches from L4-S1.  No acute fracture.  No focal osseous lesion.                                        Medications   famotidine tablet 20 mg (has no administration in time range)   sodium chloride 0.9% bolus 1,000 mL 1,000 mL (has no administration in time range)   piperacillin-tazobactam (ZOSYN) 4.5 g in D5W 100 mL IVPB (MB+) (4.5 g Intravenous New Bag 7/1/25 1211)   morphine injection 2 mg (2 mg Intravenous Given 7/1/25 1209)   iohexoL (OMNIPAQUE 350) injection 75 mL (75 mLs Intravenous Given 7/1/25 1224)     Medical Decision Making  25 y/o female which presents to the ED with right sided vaginal pain. She has extensive edema and induration concerning for cellulitis with underlying abscess. Gynecology consulted and requested a CT. Due to the patient being tachycardic, blood cultures, lactic, and labs were completed. Lactic normal. Leukocytosis noted on CBC. CT AP shows a 2 cm abscess with surrounding cellulitis. Gynecology will admit the patient and bring her to surgery for drainage. Pt given zosyn in the ED along with morphine for pain. Pt updated on plan and agrees to admission.     Differential Diagnosis: cellulitis, abscess, sepsis, STD    Problems Addressed:  Abscess of labia majora: acute illness or injury  Tachycardia: acute illness or injury  Vulvar cellulitis: acute illness or injury    Amount and/or Complexity of Data Reviewed  Labs: ordered. Decision-making details documented in ED Course.  Radiology: ordered. Decision-making details documented in ED Course.    Risk  Prescription drug management.  Minor surgery with no identified risk factors.               ED Course as of 07/01/25 1318   Tue Jul 01, 2025   1027 BP: 106/68 [AT]   1027 Temp: 98.7 °F (37.1 °C) [AT]   1027 Temp Source: Oral [AT]   1027 Pulse(!): 112 [AT]   1027 SpO2: 100 % [AT]   1027 Resp: 18 [AT]   1125 WBC(!): 14.01 [AT]   1151 Lactic Acid Level: 1.6 [AT]   1228 CT Abdomen Pelvis With IV Contrast NO Oral Contrast  Independent interpretation: Small abscess with extensive soft tissue edema/inflammation concerning for cellulitis  [AT]      ED Course User Index  [AT] Yoana Martines FNP                           Clinical Impression:  Final diagnoses:  [N76.2] Vulvar cellulitis (Primary)  [N76.4] Abscess of labia majora  [R00.0] Tachycardia          ED Disposition Condition    Observation                         [1]   Social History  Tobacco Use    Smoking status: Never    Smokeless tobacco: Never   Substance Use Topics    Alcohol use: No    Drug use: No        Yoana Martines FNP  07/01/25 1318

## 2025-07-01 NOTE — TRANSFER OF CARE
"Anesthesia Transfer of Care Note    Patient: Perla Mallory    Procedure(s) Performed: Procedure(s) (LRB):  INCISION AND DRAINAGE, LABIA (Right)    Patient location: PACU    Anesthesia Type: general    Transport from OR: Transported from OR on 6-10 L/min O2 by face mask with adequate spontaneous ventilation    Post pain: adequate analgesia    Post assessment: no apparent anesthetic complications    Post vital signs: stable    Level of consciousness: awake and alert    Nausea/Vomiting: no nausea/vomiting    Complications: none    Transfer of care protocol was followed      Last vitals: Visit Vitals  /80 (BP Location: Right arm, Patient Position: Sitting)   Pulse 98   Temp 37.1 °C (98.7 °F) (Oral)   Resp 18   Ht 5' 3" (1.6 m)   Wt 61.2 kg (135 lb)   SpO2 97%   Breastfeeding No   BMI 23.91 kg/m²     "

## 2025-07-01 NOTE — PROGRESS NOTES
"Pharmacokinetic Initial Assessment: IV Vancomycin    Assessment/Plan:    Initiate intravenous vancomycin with loading dose of 1500 mg once followed by a maintenance dose of vancomycin 1000 mg IV every 12 hours  Desired empiric serum trough concentration is 10 to 20 mcg/mL  Draw vancomycin trough level 60 min prior to fourth dose on 7/3/2025 at approximately 05:00  Pharmacy will continue to follow and monitor vancomycin.      Please contact pharmacy at extension [phone] with any questions regarding this assessment.     Thank you for the consult,   Bela Robles       Patient brief summary:  Perla Mallory is a 24 y.o. female initiated on antimicrobial therapy with IV Vancomycin for treatment of suspected skin & soft tissue infection    Drug Allergies:   Review of patient's allergies indicates:  No Known Allergies    Actual Body Weight:   61.2 kg    Renal Function:   Estimated Creatinine Clearance: 91.2 mL/min (based on SCr of 0.8 mg/dL).,     Dialysis Method (if applicable):  N/A    CBC (last 72 hours):  Recent Labs   Lab Result Units 07/01/25  1110   WBC K/uL 14.01*   HGB gm/dL 13.7   HCT % 41.4   Platelet Count K/uL 150   Lymph % % 6.0*   Mono % % 6.8   Eos % % 0.0   Basophil % % 0.1       Metabolic Panel (last 72 hours):  Recent Labs   Lab Result Units 07/01/25  1110   Sodium mmol/L 134*   Potassium mmol/L 4.1   Chloride mmol/L 101   CO2 mmol/L 22*   Glucose mg/dL 92   BUN mg/dL 8   Creatinine mg/dL 0.8   Albumin g/dL 3.7   Bilirubin Total mg/dL 0.5   ALP unit/L 56   AST unit/L 18   ALT unit/L 13       Drug levels (last 3 results):  No results for input(s): "VANCOMYCINRA", "VANCORANDOM", "VANCOMYCINPE", "VANCOPEAK", "VANCOMYCINTR", "VANCOTROUGH" in the last 72 hours.    Microbiologic Results:  Microbiology Results (last 7 days)       Procedure Component Value Units Date/Time    Culture, Anaerobic [[id number]] Collected: 07/01/25 0139    Order Status: Sent Specimen: Tissue from Vagina Updated: 07/01/25 8506    Aerobic " culture [[id number]] Collected: 07/01/25 1649    Order Status: Sent Specimen: Tissue from Vagina Updated: 07/01/25 1729    Blood Culture #2 **CANNOT BE ORDERED STAT** [[id number]] Collected: 07/01/25 1111    Order Status: Sent Specimen: Blood from Peripheral, Lower Arm, Left Updated: 07/01/25 1116    Blood Culture #1 **CANNOT BE ORDERED STAT** [[id number]] Collected: 07/01/25 1111    Order Status: Sent Specimen: Blood from Peripheral, Antecubital, Right Updated: 07/01/25 1116

## 2025-07-02 LAB
ABSOLUTE EOSINOPHIL (OHS): 0 K/UL
ABSOLUTE MONOCYTE (OHS): 0.69 K/UL (ref 0.3–1)
ABSOLUTE NEUTROPHIL COUNT (OHS): 15.79 K/UL (ref 1.8–7.7)
BASOPHILS # BLD AUTO: 0.02 K/UL
BASOPHILS NFR BLD AUTO: 0.1 %
ERYTHROCYTE [DISTWIDTH] IN BLOOD BY AUTOMATED COUNT: 12.7 % (ref 11.5–14.5)
HCT VFR BLD AUTO: 39.6 % (ref 37–48.5)
HGB BLD-MCNC: 13.2 GM/DL (ref 12–16)
IMM GRANULOCYTES # BLD AUTO: 0.13 K/UL (ref 0–0.04)
IMM GRANULOCYTES NFR BLD AUTO: 0.8 % (ref 0–0.5)
LYMPHOCYTES # BLD AUTO: 0.47 K/UL (ref 1–4.8)
MCH RBC QN AUTO: 30.7 PG (ref 27–31)
MCHC RBC AUTO-ENTMCNC: 33.3 G/DL (ref 32–36)
MCV RBC AUTO: 92 FL (ref 82–98)
NUCLEATED RBC (/100WBC) (OHS): 0 /100 WBC
PLATELET # BLD AUTO: 172 K/UL (ref 150–450)
PMV BLD AUTO: 9.4 FL (ref 9.2–12.9)
RBC # BLD AUTO: 4.3 M/UL (ref 4–5.4)
RELATIVE EOSINOPHIL (OHS): 0 %
RELATIVE LYMPHOCYTE (OHS): 2.7 % (ref 18–48)
RELATIVE MONOCYTE (OHS): 4 % (ref 4–15)
RELATIVE NEUTROPHIL (OHS): 92.4 % (ref 38–73)
WBC # BLD AUTO: 17.1 K/UL (ref 3.9–12.7)

## 2025-07-02 PROCEDURE — 25000003 PHARM REV CODE 250

## 2025-07-02 PROCEDURE — 63600175 PHARM REV CODE 636 W HCPCS: Performed by: STUDENT IN AN ORGANIZED HEALTH CARE EDUCATION/TRAINING PROGRAM

## 2025-07-02 PROCEDURE — 85025 COMPLETE CBC W/AUTO DIFF WBC: CPT

## 2025-07-02 PROCEDURE — 99231 SBSQ HOSP IP/OBS SF/LOW 25: CPT | Mod: ,,, | Performed by: STUDENT IN AN ORGANIZED HEALTH CARE EDUCATION/TRAINING PROGRAM

## 2025-07-02 PROCEDURE — 63600175 PHARM REV CODE 636 W HCPCS

## 2025-07-02 PROCEDURE — 25000003 PHARM REV CODE 250: Performed by: STUDENT IN AN ORGANIZED HEALTH CARE EDUCATION/TRAINING PROGRAM

## 2025-07-02 PROCEDURE — 11000001 HC ACUTE MED/SURG PRIVATE ROOM

## 2025-07-02 PROCEDURE — 36415 COLL VENOUS BLD VENIPUNCTURE: CPT

## 2025-07-02 PROCEDURE — A4216 STERILE WATER/SALINE, 10 ML: HCPCS

## 2025-07-02 RX ORDER — ACETAMINOPHEN 325 MG/1
650 TABLET ORAL
Status: DISCONTINUED | OUTPATIENT
Start: 2025-07-02 | End: 2025-07-06 | Stop reason: HOSPADM

## 2025-07-02 RX ORDER — IBUPROFEN 600 MG/1
600 TABLET, FILM COATED ORAL EVERY 6 HOURS
Status: DISCONTINUED | OUTPATIENT
Start: 2025-07-02 | End: 2025-07-06 | Stop reason: HOSPADM

## 2025-07-02 RX ORDER — HYDROMORPHONE HYDROCHLORIDE 1 MG/ML
1 INJECTION, SOLUTION INTRAMUSCULAR; INTRAVENOUS; SUBCUTANEOUS ONCE
Status: COMPLETED | OUTPATIENT
Start: 2025-07-02 | End: 2025-07-02

## 2025-07-02 RX ORDER — HYDROMORPHONE HYDROCHLORIDE 2 MG/ML
0.5 INJECTION, SOLUTION INTRAMUSCULAR; INTRAVENOUS; SUBCUTANEOUS
Status: DISCONTINUED | OUTPATIENT
Start: 2025-07-02 | End: 2025-07-06

## 2025-07-02 RX ORDER — MUPIROCIN 20 MG/G
OINTMENT TOPICAL 2 TIMES DAILY
Status: DISCONTINUED | OUTPATIENT
Start: 2025-07-02 | End: 2025-07-06 | Stop reason: HOSPADM

## 2025-07-02 RX ORDER — DOCUSATE SODIUM 100 MG/1
100 CAPSULE, LIQUID FILLED ORAL DAILY
Status: DISCONTINUED | OUTPATIENT
Start: 2025-07-02 | End: 2025-07-06 | Stop reason: HOSPADM

## 2025-07-02 RX ORDER — OXYCODONE HYDROCHLORIDE 10 MG/1
10 TABLET ORAL EVERY 4 HOURS PRN
Refills: 0 | Status: DISCONTINUED | OUTPATIENT
Start: 2025-07-02 | End: 2025-07-06 | Stop reason: HOSPADM

## 2025-07-02 RX ADMIN — PIPERACILLIN SODIUM AND TAZOBACTAM SODIUM 4.5 G: 4; .5 INJECTION, POWDER, FOR SOLUTION INTRAVENOUS at 02:07

## 2025-07-02 RX ADMIN — OXYCODONE HYDROCHLORIDE 5 MG: 5 TABLET ORAL at 01:07

## 2025-07-02 RX ADMIN — PIPERACILLIN SODIUM AND TAZOBACTAM SODIUM 4.5 G: 4; .5 INJECTION, POWDER, FOR SOLUTION INTRAVENOUS at 06:07

## 2025-07-02 RX ADMIN — LAMOTRIGINE 200 MG: 100 TABLET ORAL at 08:07

## 2025-07-02 RX ADMIN — BUSPIRONE HYDROCHLORIDE 10 MG: 10 TABLET ORAL at 02:07

## 2025-07-02 RX ADMIN — BUSPIRONE HYDROCHLORIDE 10 MG: 10 TABLET ORAL at 08:07

## 2025-07-02 RX ADMIN — HYDROMORPHONE HYDROCHLORIDE 0.5 MG: 2 INJECTION INTRAMUSCULAR; INTRAVENOUS; SUBCUTANEOUS at 02:07

## 2025-07-02 RX ADMIN — OXYCODONE HYDROCHLORIDE 5 MG: 5 TABLET ORAL at 04:07

## 2025-07-02 RX ADMIN — DOCUSATE SODIUM 100 MG: 100 CAPSULE, LIQUID FILLED ORAL at 05:07

## 2025-07-02 RX ADMIN — SODIUM CHLORIDE 10 ML: 9 INJECTION, SOLUTION INTRAMUSCULAR; INTRAVENOUS; SUBCUTANEOUS at 09:07

## 2025-07-02 RX ADMIN — VANCOMYCIN HYDROCHLORIDE 1000 MG: 1 INJECTION, POWDER, LYOPHILIZED, FOR SOLUTION INTRAVENOUS at 05:07

## 2025-07-02 RX ADMIN — VANCOMYCIN HYDROCHLORIDE 1000 MG: 1 INJECTION, POWDER, LYOPHILIZED, FOR SOLUTION INTRAVENOUS at 04:07

## 2025-07-02 RX ADMIN — IBUPROFEN 600 MG: 600 TABLET ORAL at 11:07

## 2025-07-02 RX ADMIN — MUPIROCIN: 20 OINTMENT TOPICAL at 08:07

## 2025-07-02 RX ADMIN — OXYCODONE HYDROCHLORIDE 5 MG: 5 TABLET ORAL at 08:07

## 2025-07-02 RX ADMIN — IBUPROFEN 600 MG: 600 TABLET ORAL at 05:07

## 2025-07-02 RX ADMIN — PROCHLORPERAZINE EDISYLATE 5 MG: 5 INJECTION INTRAMUSCULAR; INTRAVENOUS at 12:07

## 2025-07-02 RX ADMIN — OXYCODONE HYDROCHLORIDE 5 MG: 5 TABLET ORAL at 06:07

## 2025-07-02 RX ADMIN — PIPERACILLIN SODIUM AND TAZOBACTAM SODIUM 4.5 G: 4; .5 INJECTION, POWDER, FOR SOLUTION INTRAVENOUS at 09:07

## 2025-07-02 RX ADMIN — LURASIDONE HYDROCHLORIDE 40 MG: 20 TABLET, FILM COATED ORAL at 08:07

## 2025-07-02 RX ADMIN — HYDROMORPHONE HYDROCHLORIDE 1 MG: 1 INJECTION, SOLUTION INTRAMUSCULAR; INTRAVENOUS; SUBCUTANEOUS at 01:07

## 2025-07-02 RX ADMIN — ACETAMINOPHEN 650 MG: 325 TABLET, FILM COATED ORAL at 08:07

## 2025-07-02 NOTE — CARE UPDATE
Afternoon Assessment:    Patient is doing well this afternoon s/p packing change. She is tired and endorses some mild chills, but overall reports feeling much better than yesterday. Rates her pain at a 6/10 but reports that she can close her legs today which is significant improvement from yesterday which is reassuring for her.     Temp:  [98.2 °F (36.8 °C)-100.4 °F (38 °C)] 98.2 °F (36.8 °C)  Pulse:  [] 86  Resp:  [16-20] 16  SpO2:  [96 %-100 %] 96 %  BP: ()/(53-75) 90/53    Patient well appearing with normal heart rate and respiratory effort. No distress at this time.     Labs:  Recent Labs   Lab 07/02/25  0323   WBC 17.10*   RBC 4.30   HGB 13.2   HCT 39.6      MCV 92   MCH 30.7   MCHC 33.3     A/P: Continue to manage pain appropriately with the utilization of scheduled ibu/tyl as well as PRN options for worsening pain overnight. Lopez to remain in place until reevaluation at next packing change.     Adela Griffith M.D.  Obstetrics and Gynecology  PGY-1

## 2025-07-02 NOTE — PLAN OF CARE
Case Management Assessment     PCP: Mya Qureshi  Pharmacy: [facility] ([facility]    Patient Arrived From: Home  Existing Help at Home: Bianka Shinebott (Mother)    Barriers to Discharge: None    Discharge Plan:    A. Home with Family   B. Home      Pt is AAOx3.  Independent at baseline.  Pt does not use DME.  PCP is correct on face sheet. Family will transport pt to home when discharged.               07/02/25 0952   Discharge Assessment   Assessment Type Discharge Planning Assessment   Confirmed/corrected address, phone number and insurance Yes   Confirmed Demographics Correct on Facesheet   Source of Information patient   Communicated ROSELIA with patient/caregiver Date not available/Unable to determine   People in Home parent(s)   Name(s) of People in Home Bianka Shawnee   Do you expect to return to your current living situation? Yes   Do you have help at home or someone to help you manage your care at home? Yes   Who are your caregiver(s) and their phone number(s)? Bianka Mallory/mother/[phone]   Prior to hospitilization cognitive status: Alert/Oriented   Current cognitive status: Alert/Oriented   Walking or Climbing Stairs Difficulty no   Dressing/Bathing Difficulty no   Equipment Currently Used at Home none   Readmission within 30 days? No   Patient currently being followed by outpatient case management? No   Do you currently have service(s) that help you manage your care at home? No   Do you take prescription medications? No   Do you have prescription coverage? Yes   Do you have any problems affording any of your prescribed medications? No   Is the patient taking medications as prescribed?   (pt does not take medication)   Who is going to help you get home at discharge? Family   How do you get to doctors appointments? car, drives self   Are you on dialysis? No   Do you take coumadin? No   Discharge Plan A Home with family   Discharge Plan B Home   DME Needed Upon Discharge  none   Discharge Plan  discussed with: Patient   Transition of Care Barriers None   Physical Activity   On average, how many days per week do you engage in moderate to strenuous exercise (like a brisk walk)? 0 days   On average, how many minutes do you engage in exercise at this level? 0 min   Alcohol Use   Q1: How often do you have a drink containing alcohol? Monthly or l   Q2: How many drinks containing alcohol do you have on a typical day when you are drinking? 3 or 4   Q3: How often do you have six or more drinks on one occasion? Never     [facility] ([address])  Initial Discharge Assessment       Primary Care Provider: Mya Qureshi MD (Inactive)    Admission Diagnosis: Vulvar cellulitis [N76.2]  Tachycardia [R00.0]  Vulvar abscess [N76.4]  Abscess of labia majora [N76.4]    Admission Date: 7/1/2025  Expected Discharge Date:     Transition of Care Barriers: (P) None    Payor: /     Extended Emergency Contact Information  Primary Emergency Contact: Bianka Mallory   [place]  Mobile Phone: [phone]  Relation: Mother  Secondary Emergency Contact: Miguel Mallory   [place]  Mobile Phone: [phone]  Relation: Father    Discharge Plan A: (P) Home with family  Discharge Plan B: (P) Home      [id number] [address]  [address]  [facility] [address]  Phone: [phone] Fax: [phone]    [id number] [place], [place] - [address]  [address]  [facility] [address]  Phone: [phone] Fax: [phone]      Initial Assessment (most recent)       Adult Discharge Assessment - 07/02/25 0952          Discharge Assessment    Assessment Type Discharge Planning Assessment (P)      Confirmed/corrected address, phone number and insurance Yes (P)      Confirmed Demographics Correct on Facesheet (P)      Source of  Information patient (P)      Communicated ROSELIA with patient/caregiver Date not available/Unable to determine (P)      People in Home parent(s) (P)      Name(s) of People in Home Bianka Mallory (P)      Do you expect to return to your current living situation? Yes (P)      Do you have help at home or someone to help you manage your care at home? Yes (P)      Who are your caregiver(s) and their phone number(s)? Bianka Mallory/mother/[phone] (P)      Prior to hospitilization cognitive status: Alert/Oriented (P)      Current cognitive status: Alert/Oriented (P)      Walking or Climbing Stairs Difficulty no (P)      Dressing/Bathing Difficulty no (P)      Equipment Currently Used at Home none (P)      Readmission within 30 days? No (P)      Patient currently being followed by outpatient case management? No (P)      Do you currently have service(s) that help you manage your care at home? No (P)      Do you take prescription medications? No (P)      Do you have prescription coverage? Yes (P)      Do you have any problems affording any of your prescribed medications? No (P)      Is the patient taking medications as prescribed? -- (P)    pt does not take medication    Who is going to help you get home at discharge? Family (P)      How do you get to doctors appointments? car, drives self (P)      Are you on dialysis? No (P)      Do you take coumadin? No (P)      Discharge Plan A Home with family (P)      Discharge Plan B Home (P)      DME Needed Upon Discharge  none (P)      Discharge Plan discussed with: Patient (P)      Transition of Care Barriers None (P)         Physical Activity    On average, how many days per week do you engage in moderate to strenuous exercise (like a brisk walk)? 0 days (P)      On average, how many minutes do you engage in exercise at this level? 0 min (P)         Alcohol Use    Q1: How often do you have a drink containing alcohol? Monthly or less (P)      Q2: How many drinks containing  alcohol do you have on a typical day when you are drinking? 3 or 4 (P)      Q3: How often do you have six or more drinks on one occasion? Never (P)

## 2025-07-02 NOTE — CARE UPDATE
Myself and Dr. Griffith to bedside for labial abscess packing change. Patient was pre-treated with 1 mg IV Dilaudid approximately 30 minutes prior. One 4x4 gauze and entire length of 1/2 inch iodoform removed intact, iodoform coated with serosanguineous drainage. Wound bed healthy appearing. Area irrigated with sterile saline flush and repacked using 1/2 inch iodoform gauze and sterile swab. Single sterile 4x4 gauze applied at incision site. Patient tolerated procedure well overall however endorsing increased pain, will give additional PRN pain medications. Plan to leave Lopez catheter in place overnight.     Ann Marie Nixon MD  OB/GYN PGY-2

## 2025-07-02 NOTE — PLAN OF CARE
Problem: Adult Inpatient Plan of Care  Goal: Plan of Care Review  Outcome: Progressing     Problem: Wound  Goal: Absence of Infection Signs and Symptoms  Outcome: Progressing  Goal: Optimal Pain Control and Function  Outcome: Progressing  Goal: Optimal Wound Healing  Outcome: Progressing

## 2025-07-02 NOTE — PROGRESS NOTES
[facility] - [facility] ([address])  Obstetrics & Gynecology  Progress Note    Patient Name: Perla Mallory  MRN: [id number]  Admission Date: 7/1/2025  Primary Care Provider: Mya Qureshi MD (Inactive)  Principal Problem: Vulvar abscess    Subjective:     Interval History: NAEON.  Pain controlled with PRN analgesia, last receiving 5 mg at 0445. Endorses mild nausea.  Denies V/F/C. Denies passing gas or BM. Has been able to eat small amounts of food without issue. Discussed packing change, beltran catheter, and expected postop management. All questions answered. No other concerns at this time.    Scheduled Meds:   busPIRone  10 mg Oral TID    lamoTRIgine  200 mg Oral Daily    lurasidone  40 mg Oral Daily    piperacillin-tazobactam (Zosyn) IV (PEDS and ADULTS) (extended infusion is not appropriate)  4.5 g Intravenous Q8H    vancomycin (VANCOCIN) IV (PEDS and ADULTS)  1,000 mg Intravenous Q12H     Continuous Infusions:  PRN Meds:  Current Facility-Administered Medications:     acetaminophen, 650 mg, Oral, Q4H PRN    famotidine, 20 mg, Oral, On Call Procedure    HYDROmorphone, 0.5 mg, Intravenous, Q2H PRN    oxyCODONE, 5 mg, Oral, Q4H PRN    prochlorperazine, 5 mg, Intravenous, Q6H PRN    sodium chloride 0.9%, 10 mL, Intravenous, PRN    Pharmacy to dose Vancomycin consult, , , Once **AND** vancomycin - pharmacy to dose, , Intravenous, pharmacy to manage frequency    Review of patient's allergies indicates:  No Known Allergies    Objective:     Vital Signs (Most Recent):  Temp: 98.6 °F (37 °C) (07/02/25 0402)  Pulse: 93 (07/02/25 0402)  Resp: 16 (07/02/25 0444)  BP: (!) 98/55 (07/02/25 0402)  SpO2: 96 % (07/02/25 0402) Vital Signs (24h Range):  Temp:  [97 °F (36.1 °C)-100.4 °F (38 °C)] 98.6 °F (37 °C)  Pulse:  [] 93  Resp:  [16-20] 16  SpO2:  [96 %-100 %] 96 %  BP: ()/(55-80) 98/55     Weight: 61.2 kg (135 lb)  Body mass index is 23.91 kg/m².  No LMP recorded.    I&O (Last 24H):    Intake/Output Summary  "(Last 24 hours) at 7/2/2025 0653  Last data filed at 7/2/2025 0435  Gross per 24 hour   Intake 1560 ml   Output 3300 ml   Net -1740 ml     Physical Exam:   Constitutional: She is oriented to person, place, and time. She appears well-developed and well-nourished. No distress.    HENT:   Head: Normocephalic and atraumatic.      Cardiovascular:  Normal rate.             Pulmonary/Chest: Effort normal.        Abdominal: Soft. She exhibits no distension. There is abdominal tenderness. There is no guarding.     Genitourinary:    Genitourinary Comments: Incision clean and draining serosanguinous fluid. Minimal edema. No purulent discharge, foul smelling odor, or significant erythema. Appropriate tenderness to palpation to ASIS consistent with packing.              Musculoskeletal: Normal range of motion.       Neurological: She is alert and oriented to person, place, and time.    Skin: Skin is warm and dry. No rash noted. She is not diaphoretic.    Psychiatric: She has a normal mood and affect. Her behavior is normal. Judgment and thought content normal.     Laboratory:  Recent Labs   Lab 07/01/25  1110 07/02/25  0323   WBC 14.01* 17.10*   HGB 13.7 13.2   HCT 41.4 39.6   MCV 93 92    172      Diagnostic Results:  CT AP (7/1/25): Reviewed  Impression:  "Partially imaged 2.0 cm subcutaneous cystic lesion in the right labia majora with extensive surrounding edema, concerning for abscess as clinically suspected.  Heterogeneous myometrial enhancement, which may be physiologic.  Other considerations include fibroids and adenomyosis.  Consider pelvic ultrasound."    Assessment/Plan:     Active Diagnoses:    Diagnosis Date Noted POA    PRINCIPAL PROBLEM:  Vulvar abscess [N76.4] 07/01/2025 Unknown    Mood disorder [F39] 07/01/2025 Unknown      Problems Resolved During this Admission:     Vulvar abscess, s/p I+D, POD 1  - VSS, last febrile to 100.4 F at 1930 on 7/1/25  - PE demonstrates appropriate postop tenderness. Incision " clean and draining serosanguinous fluid. Skin is non-erythematous and edema is appropriate in the setting of recent surgical procedure.   - Leukocytosis slightly increased; may be secondary to stress of surgery or worsening infection  - Continue IV antibiotics; Vancomycin (pharmacy to dose, appreciate assistance) and Zosysn 4.5 g q8hr for at least 24 hours after last fever.  Anticipate transitioning to PO TMP-SMX when appropriate.  - Will change packing this AM following premedication with hydromorphone; beltran to remain in place until after packing change to assess feasibility of removal  - Regular diet  - SCDs while in bed    Mood disorder  - Continue home meds; holding Adderall    Dispo: Continue wound management today and anticipate discharge POD#2-3 if pt meets postop milestones and tolerating dressing changes.     Adela Griffith M.D.  Obstetrics and Gynecology  PGY-1

## 2025-07-03 ENCOUNTER — TELEPHONE (OUTPATIENT)
Dept: OBSTETRICS AND GYNECOLOGY | Facility: CLINIC | Age: 25
End: 2025-07-03
Payer: COMMERCIAL

## 2025-07-03 LAB
ABSOLUTE EOSINOPHIL (OHS): 0.06 K/UL
ABSOLUTE MONOCYTE (OHS): 0.91 K/UL (ref 0.3–1)
ABSOLUTE NEUTROPHIL COUNT (OHS): 11.77 K/UL (ref 1.8–7.7)
ANION GAP (OHS): 8 MMOL/L (ref 8–16)
BASOPHILS # BLD AUTO: 0.02 K/UL
BASOPHILS NFR BLD AUTO: 0.1 %
BUN SERPL-MCNC: 9 MG/DL (ref 6–20)
CALCIUM SERPL-MCNC: 9.3 MG/DL (ref 8.7–10.5)
CHLORIDE SERPL-SCNC: 107 MMOL/L (ref 95–110)
CO2 SERPL-SCNC: 27 MMOL/L (ref 23–29)
CREAT SERPL-MCNC: 0.8 MG/DL (ref 0.5–1.4)
ERYTHROCYTE [DISTWIDTH] IN BLOOD BY AUTOMATED COUNT: 12.5 % (ref 11.5–14.5)
GFR SERPLBLD CREATININE-BSD FMLA CKD-EPI: >60 ML/MIN/1.73/M2
GLUCOSE SERPL-MCNC: 91 MG/DL (ref 70–110)
HCT VFR BLD AUTO: 37.4 % (ref 37–48.5)
HGB BLD-MCNC: 12 GM/DL (ref 12–16)
IMM GRANULOCYTES # BLD AUTO: 0.06 K/UL (ref 0–0.04)
IMM GRANULOCYTES NFR BLD AUTO: 0.4 % (ref 0–0.5)
LYMPHOCYTES # BLD AUTO: 1.38 K/UL (ref 1–4.8)
MCH RBC QN AUTO: 29.7 PG (ref 27–31)
MCHC RBC AUTO-ENTMCNC: 32.1 G/DL (ref 32–36)
MCV RBC AUTO: 93 FL (ref 82–98)
NUCLEATED RBC (/100WBC) (OHS): 0 /100 WBC
PLATELET # BLD AUTO: 181 K/UL (ref 150–450)
PMV BLD AUTO: 9.1 FL (ref 9.2–12.9)
POTASSIUM SERPL-SCNC: 4.2 MMOL/L (ref 3.5–5.1)
RBC # BLD AUTO: 4.04 M/UL (ref 4–5.4)
RELATIVE EOSINOPHIL (OHS): 0.4 %
RELATIVE LYMPHOCYTE (OHS): 9.7 % (ref 18–48)
RELATIVE MONOCYTE (OHS): 6.4 % (ref 4–15)
RELATIVE NEUTROPHIL (OHS): 83 % (ref 38–73)
SODIUM SERPL-SCNC: 142 MMOL/L (ref 136–145)
VANCOMYCIN TROUGH SERPL-MCNC: 9.7 UG/ML (ref 10–22)
WBC # BLD AUTO: 14.2 K/UL (ref 3.9–12.7)

## 2025-07-03 PROCEDURE — 25000003 PHARM REV CODE 250

## 2025-07-03 PROCEDURE — 25000003 PHARM REV CODE 250: Performed by: OBSTETRICS & GYNECOLOGY

## 2025-07-03 PROCEDURE — 63600175 PHARM REV CODE 636 W HCPCS

## 2025-07-03 PROCEDURE — 80202 ASSAY OF VANCOMYCIN: CPT | Performed by: STUDENT IN AN ORGANIZED HEALTH CARE EDUCATION/TRAINING PROGRAM

## 2025-07-03 PROCEDURE — 11000001 HC ACUTE MED/SURG PRIVATE ROOM

## 2025-07-03 PROCEDURE — 99233 SBSQ HOSP IP/OBS HIGH 50: CPT | Mod: ,,, | Performed by: OBSTETRICS & GYNECOLOGY

## 2025-07-03 PROCEDURE — 94761 N-INVAS EAR/PLS OXIMETRY MLT: CPT

## 2025-07-03 PROCEDURE — 85025 COMPLETE CBC W/AUTO DIFF WBC: CPT

## 2025-07-03 PROCEDURE — 36415 COLL VENOUS BLD VENIPUNCTURE: CPT

## 2025-07-03 PROCEDURE — 25000003 PHARM REV CODE 250: Performed by: STUDENT IN AN ORGANIZED HEALTH CARE EDUCATION/TRAINING PROGRAM

## 2025-07-03 PROCEDURE — 63600175 PHARM REV CODE 636 W HCPCS: Performed by: STUDENT IN AN ORGANIZED HEALTH CARE EDUCATION/TRAINING PROGRAM

## 2025-07-03 PROCEDURE — 80048 BASIC METABOLIC PNL TOTAL CA: CPT

## 2025-07-03 RX ORDER — HYDROMORPHONE HYDROCHLORIDE 2 MG/ML
1.5 INJECTION, SOLUTION INTRAMUSCULAR; INTRAVENOUS; SUBCUTANEOUS ONCE
Status: COMPLETED | OUTPATIENT
Start: 2025-07-03 | End: 2025-07-03

## 2025-07-03 RX ORDER — SULFAMETHOXAZOLE AND TRIMETHOPRIM 800; 160 MG/1; MG/1
1 TABLET ORAL 2 TIMES DAILY
Qty: 28 TABLET | Refills: 0 | Status: SHIPPED | OUTPATIENT
Start: 2025-07-03 | End: 2025-07-06

## 2025-07-03 RX ORDER — ONDANSETRON HYDROCHLORIDE 2 MG/ML
4 INJECTION, SOLUTION INTRAVENOUS EVERY 6 HOURS PRN
Status: DISCONTINUED | OUTPATIENT
Start: 2025-07-03 | End: 2025-07-03

## 2025-07-03 RX ORDER — IBUPROFEN 600 MG/1
600 TABLET, FILM COATED ORAL EVERY 6 HOURS
Qty: 60 TABLET | Refills: 1 | Status: SHIPPED | OUTPATIENT
Start: 2025-07-03 | End: 2025-07-06

## 2025-07-03 RX ORDER — SULFAMETHOXAZOLE AND TRIMETHOPRIM 800; 160 MG/1; MG/1
1 TABLET ORAL 2 TIMES DAILY
Status: DISCONTINUED | OUTPATIENT
Start: 2025-07-03 | End: 2025-07-06 | Stop reason: HOSPADM

## 2025-07-03 RX ORDER — OXYCODONE HYDROCHLORIDE 5 MG/1
5 TABLET ORAL EVERY 4 HOURS PRN
Qty: 20 TABLET | Refills: 0 | Status: SHIPPED | OUTPATIENT
Start: 2025-07-03 | End: 2025-07-06

## 2025-07-03 RX ORDER — DOCUSATE SODIUM 100 MG/1
100 CAPSULE, LIQUID FILLED ORAL 2 TIMES DAILY
Qty: 30 CAPSULE | Refills: 1 | Status: SHIPPED | OUTPATIENT
Start: 2025-07-03 | End: 2025-07-06

## 2025-07-03 RX ORDER — DEXTROMETHORPHAN HYDROBROMIDE, GUAIFENESIN 5; 100 MG/5ML; MG/5ML
650 LIQUID ORAL EVERY 6 HOURS
Qty: 60 TABLET | Refills: 1 | Status: SHIPPED | OUTPATIENT
Start: 2025-07-03 | End: 2025-07-06

## 2025-07-03 RX ORDER — ONDANSETRON 4 MG/1
4 TABLET, FILM COATED ORAL EVERY 6 HOURS PRN
Status: DISCONTINUED | OUTPATIENT
Start: 2025-07-03 | End: 2025-07-06

## 2025-07-03 RX ADMIN — HYDROMORPHONE HYDROCHLORIDE 0.5 MG: 2 INJECTION INTRAMUSCULAR; INTRAVENOUS; SUBCUTANEOUS at 09:07

## 2025-07-03 RX ADMIN — ACETAMINOPHEN 650 MG: 325 TABLET, FILM COATED ORAL at 02:07

## 2025-07-03 RX ADMIN — PROCHLORPERAZINE EDISYLATE 5 MG: 5 INJECTION INTRAMUSCULAR; INTRAVENOUS at 07:07

## 2025-07-03 RX ADMIN — IBUPROFEN 600 MG: 600 TABLET ORAL at 12:07

## 2025-07-03 RX ADMIN — IBUPROFEN 600 MG: 600 TABLET ORAL at 05:07

## 2025-07-03 RX ADMIN — LAMOTRIGINE 200 MG: 100 TABLET ORAL at 08:07

## 2025-07-03 RX ADMIN — PIPERACILLIN SODIUM AND TAZOBACTAM SODIUM 4.5 G: 4; .5 INJECTION, POWDER, FOR SOLUTION INTRAVENOUS at 05:07

## 2025-07-03 RX ADMIN — MUPIROCIN: 20 OINTMENT TOPICAL at 08:07

## 2025-07-03 RX ADMIN — OXYCODONE HYDROCHLORIDE 10 MG: 10 TABLET ORAL at 04:07

## 2025-07-03 RX ADMIN — BUSPIRONE HYDROCHLORIDE 10 MG: 10 TABLET ORAL at 08:07

## 2025-07-03 RX ADMIN — PROCHLORPERAZINE EDISYLATE 5 MG: 5 INJECTION INTRAMUSCULAR; INTRAVENOUS at 01:07

## 2025-07-03 RX ADMIN — ONDANSETRON HYDROCHLORIDE 4 MG: 4 TABLET, FILM COATED ORAL at 11:07

## 2025-07-03 RX ADMIN — OXYCODONE HYDROCHLORIDE 5 MG: 5 TABLET ORAL at 08:07

## 2025-07-03 RX ADMIN — BUSPIRONE HYDROCHLORIDE 10 MG: 10 TABLET ORAL at 02:07

## 2025-07-03 RX ADMIN — OXYCODONE HYDROCHLORIDE 5 MG: 5 TABLET ORAL at 02:07

## 2025-07-03 RX ADMIN — ACETAMINOPHEN 650 MG: 325 TABLET, FILM COATED ORAL at 08:07

## 2025-07-03 RX ADMIN — SULFAMETHOXAZOLE AND TRIMETHOPRIM 1 TABLET: 800; 160 TABLET ORAL at 08:07

## 2025-07-03 RX ADMIN — IBUPROFEN 600 MG: 600 TABLET ORAL at 06:07

## 2025-07-03 RX ADMIN — ACETAMINOPHEN 650 MG: 325 TABLET, FILM COATED ORAL at 03:07

## 2025-07-03 RX ADMIN — DOCUSATE SODIUM 100 MG: 100 CAPSULE, LIQUID FILLED ORAL at 08:07

## 2025-07-03 RX ADMIN — HYDROMORPHONE HYDROCHLORIDE 1.5 MG: 2 INJECTION INTRAMUSCULAR; INTRAVENOUS; SUBCUTANEOUS at 09:07

## 2025-07-03 RX ADMIN — SODIUM CHLORIDE 1250 MG: 0.9 INJECTION, SOLUTION INTRAVENOUS at 05:07

## 2025-07-03 RX ADMIN — PIPERACILLIN SODIUM AND TAZOBACTAM SODIUM 4.5 G: 4; .5 INJECTION, POWDER, FOR SOLUTION INTRAVENOUS at 03:07

## 2025-07-03 NOTE — PROGRESS NOTES
Therapy with vancomycin complete and/or consult discontinued by provider.  Pharmacy will sign off, please re-consult as needed.    Thank you for the consult,    Bela Robles  07/03/2025

## 2025-07-03 NOTE — PROGRESS NOTES
[facility] ([address])  Obstetrics & Gynecology  Progress Note    Patient Name: Perla Mallory  MRN: [id number]  Admission Date: 7/1/2025  Primary Care Provider: Mya Qureshi MD (Inactive)  Principal Problem: Vulvar abscess    Subjective:     Interval History: NAEON.  Pain controlled with scheduled and PRN analgesia, last receiving oxy 10mg at 0445. Rates 6/10 currently, but up to 11/10 before oxy 10. Endorses mild nausea. Minimal appetite, but keeping smoothies and small bites down. Denies V/F/C. Endorses passing gas, denies BM. Discussed packing change again today, beltran catheter, and expected postop management. All questions answered. No other concerns at this time.    Scheduled Meds:   acetaminophen  650 mg Oral Q6H    busPIRone  10 mg Oral TID    docusate sodium  100 mg Oral Daily    ibuprofen  600 mg Oral Q6H    lamoTRIgine  200 mg Oral Daily    lurasidone  40 mg Oral Daily    mupirocin   Nasal BID    piperacillin-tazobactam (Zosyn) IV (PEDS and ADULTS) (extended infusion is not appropriate)  4.5 g Intravenous Q8H    vancomycin (VANCOCIN) IV (PEDS and ADULTS)  1,250 mg Intravenous Q12H     Continuous Infusions:  PRN Meds:  Current Facility-Administered Medications:     famotidine, 20 mg, Oral, On Call Procedure    HYDROmorphone, 0.5 mg, Intravenous, Q2H PRN    oxyCODONE, 5 mg, Oral, Q4H PRN    oxyCODONE, 10 mg, Oral, Q4H PRN    prochlorperazine, 5 mg, Intravenous, Q6H PRN    sodium chloride 0.9%, 10 mL, Intravenous, PRN    Pharmacy to dose Vancomycin consult, , , Once **AND** vancomycin - pharmacy to dose, , Intravenous, pharmacy to manage frequency    Review of patient's allergies indicates:  No Known Allergies    Objective:     Vital Signs (Most Recent):  Temp: 97.7 °F (36.5 °C) (07/03/25 0542)  Pulse: 71 (07/03/25 0436)  Resp: 16 (07/03/25 0444)  BP: (!) 101/58 (07/03/25 0436)  SpO2: 99 % (07/03/25 0436) Vital Signs (24h Range):  Temp:  [97.7 °F (36.5 °C)-98.7 °F (37.1 °C)] 97.7 °F (36.5  "°C)  Pulse:  [] 71  Resp:  [16-18] 16  SpO2:  [96 %-100 %] 99 %  BP: ()/(53-75) 101/58     Weight: 61.2 kg (135 lb)  Body mass index is 23.91 kg/m².  No LMP recorded.    I&O (Last 24H):    Intake/Output Summary (Last 24 hours) at 7/3/2025 0643  Last data filed at 7/3/2025 0520  Gross per 24 hour   Intake 200 ml   Output 1400 ml   Net -1200 ml     Physical Exam:   Constitutional: She is oriented to person, place, and time. She appears well-developed and well-nourished. No distress.    HENT:   Head: Normocephalic and atraumatic.      Cardiovascular:  Normal rate.             Pulmonary/Chest: Effort normal.        Abdominal: Soft. She exhibits no distension. There is no guarding.     Genitourinary:    Genitourinary Comments: Incision clean and draining serosanguinous fluid. Packing in place. Improved edema. No purulent discharge, foul smelling odor, or significant erythema. Appropriate tenderness to palpation half-way to ASIS consistent with packing.              Musculoskeletal: Normal range of motion.       Neurological: She is alert and oriented to person, place, and time.    Skin: Skin is warm and dry. No rash noted. She is not diaphoretic.    Psychiatric: She has a normal mood and affect. Her behavior is normal. Judgment and thought content normal.     Laboratory:  Recent Labs   Lab 07/01/25  1110 07/02/25  0323 07/03/25  0427   WBC 14.01* 17.10* 14.20*   HGB 13.7 13.2 12.0   HCT 41.4 39.6 37.4   MCV 93 92 93    172 181      Diagnostic Results:  CT AP (7/1/25): Reviewed  Impression:  "Partially imaged 2.0 cm subcutaneous cystic lesion in the right labia majora with extensive surrounding edema, concerning for abscess as clinically suspected.  Heterogeneous myometrial enhancement, which may be physiologic.  Other considerations include fibroids and adenomyosis.  Consider pelvic ultrasound."    Assessment/Plan:     Active Diagnoses:    Diagnosis Date Noted POA    PRINCIPAL PROBLEM:  Vulvar abscess " [N76.4] 07/01/2025 Unknown    Mood disorder [F39] 07/01/2025 Unknown      Problems Resolved During this Admission:     Vulvar abscess, s/p I+D, POD 1  - VSS, last febrile to 100.4 F at 1930 on 7/1/25  - PE demonstrates appropriate postop tenderness. Incision clean and draining serosanguinous fluid. Skin is non-erythematous and edema is appropriate in the setting of recent surgical procedure. Pain and ROM is significantly improved from original presentation.   - Leukocytosis improving  - Continue IV antibiotics; Vancomycin (pharmacy to dose, appreciate assistance) and Zosysn 4.5 g q8hr for 48 hours after last fever.  Anticipate transitioning to PO TMP-SMX when appropriate, likely this evening.  - Will change packing this AM following premedication with hydromorphone; beltran to remain in place until after packing change to assess feasibility of removal  - Regular diet  - SCDs while in bed  - Inpatient consult to wound care for  for wound changes. Appreciate assistance and recommendations.    Mood disorder  - Continue home meds; holding Adderall    Dispo: Continue wound management today and anticipate discharge POD#2-4 if pt meets postop milestones and tolerating dressing changes.     Adela Griffith M.D.  Obstetrics and Gynecology  PGY-1

## 2025-07-03 NOTE — PLAN OF CARE
07/03/25 1038   Post-Acute Status   Post-Acute Authorization Home Health   Home Health Status Referrals Sent   Patient choice form signed by patient/caregiver List with quality metrics by geographic area provided;List from CMS Compare;List from System Post-Acute Care   Discharge Delays None known at this time   Discharge Plan   Discharge Plan A Home with family;Home Health   Discharge Plan B Home with family     Met with patient to review discharge recommendation of HH and is agreeable to plan    Patient/family provided list of facilities in-network with patient's payor plan. Providers that are owned, operated, or affiliated with [facility] are included on the list.       Patient has declined to select a preferred provider and elects placement with the first accepting in network provider that is available to provide services as ordered by the physician       If an additional preferred facility not listed above is identified, additional referral to be sent. If above facilities unable to accept, will send additional referrals to in-network providers.

## 2025-07-03 NOTE — PLAN OF CARE
[facility] ([address])    HOME HEALTH ORDERS  FACE TO FACE ENCOUNTER    Patient Name: Perla Mallory  YOB: 2000    PCP: Mya Qureshi MD (Inactive)   PCP Address: [address] [facility] / [place] [address]  PCP Phone Number: [phone]  PCP Fax: [phone]    Encounter Date: 7/1/25    Admit to Home Health    Diagnoses:  Active Hospital Problems    Diagnosis  POA    *Vulvar abscess s/p I&D [N76.4]  Yes    Mood disorder [F39]  Yes      Resolved Hospital Problems   No resolved problems to display.       Follow Up Appointments:  No future appointments.  Patient will be scheduled for follow up appointment 7/17/2025 PM with gynecology located at [address] [place] [address] [address] [place] [id number].    Allergies:Review of patient's allergies indicates:  No Known Allergies    Medications: Review discharge medications with patient and family and provide education.    Current Medications[1]     Medication List        START taking these medications      acetaminophen 650 MG Tbsr  Commonly known as: TYLENOL  Take 1 tablet (650 mg total) by mouth every 6 (six) hours. Alternate between ibuprofen and tylenol every 3 hours. For example: @0800: ibuprofen 600mg @1100: tylenol 650mg @1400: ibuprofen 600mg @1700: tylenol 650 mg @2000: ibuprofen 600mg     docusate sodium 100 MG capsule  Commonly known as: COLACE  Take 1 capsule (100 mg total) by mouth 2 (two) times daily.     ibuprofen 600 MG tablet  Commonly known as: ADVIL,MOTRIN  Take 1 tablet (600 mg total) by mouth every 6 (six) hours.     oxyCODONE 5 MG immediate release tablet  Commonly known as: ROXICODONE  Take 1 tablet (5 mg total) by mouth every 4 (four) hours as needed for Pain.     sulfamethoxazole-trimethoprim 800-160mg 800-160 mg Tab  Commonly known as: BACTRIM DS  Take 1 tablet by mouth 2 (two) times daily. for 14 days            CONTINUE taking these medications      * AdderalL 10 mg Tab  Generic drug:  dextroamphetamine-amphetamine  Take 1 tablet by mouth every evening.     * AdderalL 15 mg tablet  Generic drug: dextroamphetamine-amphetamine  Take 1 tablet by mouth every morning.     busPIRone 10 MG tablet  Commonly known as: BUSPAR  Take 10 mg by mouth 3 (three) times daily.     lamoTRIgine 200 MG tablet  Commonly known as: LAMICTAL  Take 200 mg by mouth once daily.     levonorgestreL 52 mg IUD  Commonly known as: Mirena  52,000 mcg by Intrauterine route.     lurasidone 40 mg Tab tablet  Commonly known as: LATUDA  Take 40 mg by mouth once daily.           * This list has 2 medication(s) that are the same as other medications prescribed for you. Read the directions carefully, and ask your doctor or other care provider to review them with you.                STOP taking these medications      benzonatate 200 MG capsule  Commonly known as: TESSALON     hydrocortisone 2.5 % cream     predniSONE 20 MG tablet  Commonly known as: DELTASONE              I have seen and examined this patient within the last 30 days. My clinical findings that support the need for the home health skilled services and home bound status are the following:  Medical restrictions requiring assistance of another human to leave home due to  Post surgery monitoring and Wound care needs.     Diet:   regular diet    Labs:  N/A    Referrals/ Consults  Nursing/wound care to provide assistance with wound packing    Activities:   activity as tolerated, no driving while on narcotics, and no sex or submersion in water (pool, bathtub, etc.) for six weeks or until wound is completely healed    Nursing:   Agency to admit patient within 24 hours of hospital discharge unless specified on physician order or at patient request    SN to complete comprehensive assessment including routine vital signs. Instruct on disease process and s/s of complications to report to MD. Review/verify medication list sent home with the patient at time of discharge and instruct  patient/caregiver as needed. Frequency may be adjusted depending on start of care date.     Skilled nurse to perform up to 3 visits PRN for symptoms related to diagnosis    Notify MD if SBP > 160 or < 90; DBP > 90 or < 50; HR > 120 or < 50; Temp > 101; O2 < 88%; Other:   signs/symptoms of wound infection    Ok to schedule additional visits based on staff availability and patient request on consecutive days within the home health episode.    When multiple disciplines ordered:    Start of Care occurs on Sunday - Wednesday schedule remaining discipline evaluations as ordered on separate consecutive days following the start of care.    Thursday SOC -schedule subsequent evaluations Friday and Monday the following week.     Friday - Saturday SOC - schedule subsequent discipline evaluations on consecutive days starting Monday of the following week.    For all post-discharge communication and subsequent orders please contact patient's primary care physician. If unable to reach primary care physician or do not receive response within 30 minutes, please contact [phone] (Gynecology Resident Pager) or call [facility]  and request to speak to gynecology team for clinical staff order clarification    Home Health Aide:  Nursing three times weekly    Wound Care Orders  yes:  Abscess s/p I&D:    Location:   Vulva (right labia majora)        Vulvar wound: Cleanse/irrigate with wound cleanser. Pack wound with Aquacel Ag Hydrofiber ribbon. Secure in place with sanitary pads or folded ABD pads and mesh underwear. Change Aquacel Ag packing every other day. Sanitary/ABD pad change prn for drainage.     I certify that this patient is confined to her home and needs intermittent skilled nursing care.    Ann Marie Nixon MD  OB/GYN PGY-2       [1]   Current Facility-Administered Medications   Medication Dose Route Frequency Provider Last Rate Last Admin    acetaminophen tablet 650 mg  650 mg Oral Q6H Ann Marie Nixon MD    650 mg at 07/03/25 0808    busPIRone tablet 10 mg  10 mg Oral TID Jude Humphreys MD   10 mg at 07/03/25 0802    docusate sodium capsule 100 mg  100 mg Oral Daily Jude Humphreys MD   100 mg at 07/03/25 0802    famotidine tablet 20 mg  20 mg Oral On Call Procedure Jude Humphreys MD        HYDROmorphone (PF) injection 0.5 mg  0.5 mg Intravenous Q2H PRN Ann Marie Nxion MD   0.5 mg at 07/03/25 0958    ibuprofen tablet 600 mg  600 mg Oral Q6H Ann Marie Nixon MD   600 mg at 07/03/25 0542    lamoTRIgine tablet 200 mg  200 mg Oral Daily Jude Humphreys MD   200 mg at 07/03/25 0802    lurasidone tablet 40 mg  40 mg Oral Daily Jude Humphreys MD   40 mg at 07/02/25 0849    mupirocin 2 % ointment   Nasal BID Kim Rizvi MD   Given at 07/03/25 0811    ondansetron injection 4 mg  4 mg Intravenous Q6H PRN Adela Griffith MD        oxyCODONE immediate release tablet 5 mg  5 mg Oral Q4H PRN Jude Humphreys MD   5 mg at 07/03/25 0802    oxyCODONE immediate release tablet Tab 10 mg  10 mg Oral Q4H PRN Ann Marie Nixon MD   10 mg at 07/03/25 0444    piperacillin-tazobactam (ZOSYN) 4.5 g in D5W 100 mL IVPB (MB+)  4.5 g Intravenous Q8H Jude Humphreys MD 25 mL/hr at 07/03/25 0542 4.5 g at 07/03/25 0542    prochlorperazine injection Soln 5 mg  5 mg Intravenous Q6H PRN Jude Humphreys MD   5 mg at 07/03/25 0759    sodium chloride 0.9% flush 10 mL  10 mL Intravenous PRN Jude Humphreys MD   10 mL at 07/02/25 2114    vancomycin - pharmacy to dose   Intravenous pharmacy to manage frequency Jude Humphreys MD        vancomycin 1,250 mg in 0.9% NaCl 250 mL IVPB (admixture device)  1,250 mg Intravenous Q12H Kim Rizvi MD   Stopped at 07/03/25 0773

## 2025-07-03 NOTE — PROGRESS NOTES
Pharmacokinetic Assessment Follow Up: IV Vancomycin    Vancomycin serum concentration assessment(s):    The trough level was drawn correctly and can be used to guide therapy at this time. The measurement is below the desired definitive target range of 10 to 20 mcg/mL.    Vancomycin Regimen Plan:    Change regimen to Vancomycin 1250 mg IV every 12 hours with next serum trough concentration measured at 1700 prior to 4th dose on 07/04/25    Drug levels (last 3 results):  Recent Labs   Lab Result Units 07/03/25  0427   Vancomycin Trough ug/ml 9.7*       Pharmacy will continue to follow and monitor vancomycin.    Please contact pharmacy at extension [phone] for questions regarding this assessment.    Thank you for the consult,   Shama Isaacs       Patient brief summary:  Perla Mallory is a 24 y.o. female initiated on antimicrobial therapy with IV Vancomycin for treatment of skin & soft tissue infection        Drug Allergies:   Review of patient's allergies indicates:  No Known Allergies    Actual Body Weight:   61.2 kg    Renal Function:   Estimated Creatinine Clearance: 91.2 mL/min (based on SCr of 0.8 mg/dL).,     Dialysis Method (if applicable):  N/A    CBC (last 72 hours):  Recent Labs   Lab Result Units 07/01/25  1110 07/02/25  0323 07/03/25  0427   WBC K/uL 14.01* 17.10* 14.20*   HGB gm/dL 13.7 13.2 12.0   HCT % 41.4 39.6 37.4   Platelet Count K/uL 150 172 181   Lymph % % 6.0* 2.7* 9.7*   Mono % % 6.8 4.0 6.4   Eos % % 0.0 0.0 0.4   Basophil % % 0.1 0.1 0.1       Metabolic Panel (last 72 hours):  Recent Labs   Lab Result Units 07/01/25  1110 07/03/25  0427   Sodium mmol/L 134* 142   Potassium mmol/L 4.1 4.2   Chloride mmol/L 101 107   CO2 mmol/L 22* 27   Glucose mg/dL 92 91   BUN mg/dL 8 9   Creatinine mg/dL 0.8 0.8   Albumin g/dL 3.7  --    Bilirubin Total mg/dL 0.5  --    ALP unit/L 56  --    AST unit/L 18  --    ALT unit/L 13  --        Vancomycin Administrations:  vancomycin given in the last 96 hours                      vancomycin (VANCOCIN) 1,000 mg in 0.9% NaCl 250 mL IVPB (admixture device) (mg) 1,000 mg New Bag 07/02/25 1751     1,000 mg New Bag  0446    vancomycin 1,500 mg in 0.9% NaCl 250 mL IVPB (admixture device) (mg) 1,500 mg New Bag 07/01/25 1849                    Microbiologic Results:  Microbiology Results (last 7 days)       Procedure Component Value Units Date/Time    Blood Culture #1 **CANNOT BE ORDERED STAT** [[id number]]  (Normal) Collected: 07/01/25 1111    Order Status: Completed Specimen: Blood from Peripheral, Antecubital, Right Updated: 07/02/25 2201     Blood Culture No Growth After 24 Hours    Blood Culture #2 **CANNOT BE ORDERED STAT** [[id number]]  (Normal) Collected: 07/01/25 1111    Order Status: Completed Specimen: Blood from Peripheral, Lower Arm, Left Updated: 07/02/25 2201     Blood Culture No Growth After 24 Hours    Culture, Anaerobic [[id number]] Collected: 07/01/25 1649    Order Status: Sent Specimen: Tissue from Vagina Updated: 07/01/25 1729    Aerobic culture [[id number]] Collected: 07/01/25 1649    Order Status: Sent Specimen: Tissue from Vagina Updated: 07/01/25 1729

## 2025-07-03 NOTE — CONSULTS
[facility] ([address])  Wound Care    Patient Name:  Perla Mallory   MRN:  [id number]  Date: 7/3/2025  Diagnosis: Vulvar abscess    History:     History reviewed. No pertinent past medical history.    Social History[1]    Precautions:     Allergies as of 07/01/2025    (No Known Allergies)       [facility] Assessment Details/Treatment     Wound care consult received for assessment of vulvar abscess s/p I&D. Patient is a 24 year old female presenting with pain and swelling in right groin. She reports concern for worsening ingrown hair as she historically gets ingrown hairs with shaving bikini area. She first noticed swelling on Saturday which worsened Sunday after she drove from [place] to [place]. She had some abnormal vaginal discharge and was concerned for a yeast infection, but the discharge improved while the abscess worsened.     She is currently post op day 2 of I&D. Gynecology team have been changing her dressings. Assessed wound bed with Dr. Griffith and Dr. Nixon. Wound care performed by MD team with Aquacel Ag Hydrofiber gauze cut into strips. Packing secured in place with maxi pad and mesh underwear.     Recommendations:   Vulvar wound: cleanse/irrigate with wound cleanser. Pack wound with Aquacel Ag Hydrofiber ribbon. Secure in place with sanitary pads or folded ABD pads and mesh underwear. Change Aquacel Ag packing every other day. Sanitary/ABD pad change prn for drainage.     Nursing and MD team notified. MD team to manage dressing changes at this time. Wound care available to assist with pt prn.      07/03/25 1016        Wound 07/01/25 1641 Incision Vagina   Date First Assessed/Time First Assessed: 07/01/25 1641   Primary Wound Type: Incision  Location: Vagina  Additional Comments: Packed with 1/2 inch iodoform, and 1 4x4 gauze   Dressing Appearance Dry;Intact;Clean;Dried drainage   Drainage Amount Small   Drainage Characteristics/Odor Serosanguineous   Appearance Pink;Red;Moist   Red (%),  Wound Tissue Color 100 %   Periwound Area Intact;Dry;Swelling   Wound Edges Open   Dressing Absorptive Pad   Packing packed with;hydrofiber/alginate   Dressing Change Due 07/05/25 07/03/2025         [1]   Social History  Socioeconomic History    Marital status: Single   Tobacco Use    Smoking status: Never    Smokeless tobacco: Never   Substance and Sexual Activity    Alcohol use: No    Drug use: No    Sexual activity: Never     Social Drivers of Health     Financial Resource Strain: Low Risk  (7/1/2025)    Overall Financial Resource Strain (CARDIA)     Difficulty of Paying Living Expenses: Not very hard   Food Insecurity: No Food Insecurity (7/1/2025)    Hunger Vital Sign     Worried About Running Out of Food in the Last Year: Never true     Ran Out of Food in the Last Year: Never true   Transportation Needs: No Transportation Needs (7/1/2025)    PRAPARE - Transportation     Lack of Transportation (Medical): No     Lack of Transportation (Non-Medical): No   Physical Activity: Inactive (7/2/2025)    Exercise Vital Sign     Days of Exercise per Week: 0 days     Minutes of Exercise per Session: 0 min   Stress: No Stress Concern Present (7/1/2025)    [employer] - Occupational Stress Questionnaire     Feeling of Stress : Not at all   Housing Stability: Low Risk  (7/1/2025)    Housing Stability Vital Sign     Unable to Pay for Housing in the Last Year: No     Homeless in the Last Year: No

## 2025-07-03 NOTE — TELEPHONE ENCOUNTER
----- Message from Ann Marie Nixon sent at 7/3/2025 10:30 AM CDT -----  Regarding: follow up  Hi team,    Can we please schedule this patient on 7/17 clinic date for post-op/wound check appt? Thank you!    Ann Marie

## 2025-07-03 NOTE — PLAN OF CARE
"YADIRA sent HH referrals out and received many denials due to patient insurance being [place] of [place]. Patient primary need for HH is wound care. [facility] [place] [facility]     Your fax has been successfully sent to [phone] at [phone].  ------------------------------------------------------------  From: [id number]  ------------------------------------------------------------  7/3/2025 2:03:29 PM Transmission Record          Sent to [phone] with remote ID "[id number]"          Result: (0/339;0/0) Success          Page record: 1 - 17          Elapsed time: 06:25 on channel 30      3:00pm  YADIRA spoke with [facility] [employer] and they have accepted patient for home to heal program and will see patient on Monday due to holiday weekend.   "

## 2025-07-03 NOTE — CARE UPDATE
Myself and Dr. Nixon to bedside for labial abscess packing change. Wound care nurse, Marina Rojas, also present for evaluation and to provide recommendations. Appreciate her assistance. Patient was pre-treated with 1.5 mg IV Dilaudid approximately 30 minutes prior. One 4x4 gauze and 3/4 length of 1/2 inch iodoform removed intact, iodoform coated with serosanguineous drainage. Wound bed healthy appearing. Area repacked using 1.5 packs of Aquacel Ag Hydrofiber ribbon cut into strips and sterile swab to incision site. Patient tolerated procedure well overall however endorsing increased pain, will give additional PRN pain medications. Anticipate next packing change in 5 days with home health. Lopez catheter removed. Passive void trial started, due to void by 15:45. Nurse Susan is aware.      Adela Griffith MD  OB/GYN PGY-1

## 2025-07-04 LAB
ABSOLUTE EOSINOPHIL (OHS): 0.14 K/UL
ABSOLUTE MONOCYTE (OHS): 0.63 K/UL (ref 0.3–1)
ABSOLUTE NEUTROPHIL COUNT (OHS): 5.27 K/UL (ref 1.8–7.7)
BACTERIA SPEC AEROBE CULT: ABNORMAL
BASOPHILS # BLD AUTO: 0.01 K/UL
BASOPHILS NFR BLD AUTO: 0.1 %
ERYTHROCYTE [DISTWIDTH] IN BLOOD BY AUTOMATED COUNT: 12.4 % (ref 11.5–14.5)
HCT VFR BLD AUTO: 37.6 % (ref 37–48.5)
HGB BLD-MCNC: 12 GM/DL (ref 12–16)
IMM GRANULOCYTES # BLD AUTO: 0.01 K/UL (ref 0–0.04)
IMM GRANULOCYTES NFR BLD AUTO: 0.1 % (ref 0–0.5)
LYMPHOCYTES # BLD AUTO: 1.8 K/UL (ref 1–4.8)
MCH RBC QN AUTO: 29.9 PG (ref 27–31)
MCHC RBC AUTO-ENTMCNC: 31.9 G/DL (ref 32–36)
MCV RBC AUTO: 94 FL (ref 82–98)
NUCLEATED RBC (/100WBC) (OHS): 0 /100 WBC
PLATELET # BLD AUTO: 197 K/UL (ref 150–450)
PMV BLD AUTO: 8.9 FL (ref 9.2–12.9)
RBC # BLD AUTO: 4.02 M/UL (ref 4–5.4)
RELATIVE EOSINOPHIL (OHS): 1.8 %
RELATIVE LYMPHOCYTE (OHS): 22.9 % (ref 18–48)
RELATIVE MONOCYTE (OHS): 8 % (ref 4–15)
RELATIVE NEUTROPHIL (OHS): 67.1 % (ref 38–73)
WBC # BLD AUTO: 7.86 K/UL (ref 3.9–12.7)

## 2025-07-04 PROCEDURE — 99232 SBSQ HOSP IP/OBS MODERATE 35: CPT | Mod: ,,, | Performed by: OBSTETRICS & GYNECOLOGY

## 2025-07-04 PROCEDURE — 25000003 PHARM REV CODE 250

## 2025-07-04 PROCEDURE — 36415 COLL VENOUS BLD VENIPUNCTURE: CPT

## 2025-07-04 PROCEDURE — 63600175 PHARM REV CODE 636 W HCPCS

## 2025-07-04 PROCEDURE — 25000003 PHARM REV CODE 250: Performed by: STUDENT IN AN ORGANIZED HEALTH CARE EDUCATION/TRAINING PROGRAM

## 2025-07-04 PROCEDURE — 11000001 HC ACUTE MED/SURG PRIVATE ROOM

## 2025-07-04 PROCEDURE — 85025 COMPLETE CBC W/AUTO DIFF WBC: CPT

## 2025-07-04 PROCEDURE — 25000003 PHARM REV CODE 250: Performed by: OBSTETRICS & GYNECOLOGY

## 2025-07-04 RX ORDER — HYDROMORPHONE HYDROCHLORIDE 1 MG/ML
1 INJECTION, SOLUTION INTRAMUSCULAR; INTRAVENOUS; SUBCUTANEOUS ONCE
Status: DISCONTINUED | OUTPATIENT
Start: 2025-07-04 | End: 2025-07-04

## 2025-07-04 RX ORDER — HYDROMORPHONE HYDROCHLORIDE 1 MG/ML
1 INJECTION, SOLUTION INTRAMUSCULAR; INTRAVENOUS; SUBCUTANEOUS ONCE
Status: COMPLETED | OUTPATIENT
Start: 2025-07-04 | End: 2025-07-04

## 2025-07-04 RX ORDER — TALC
6 POWDER (GRAM) TOPICAL NIGHTLY PRN
Status: DISCONTINUED | OUTPATIENT
Start: 2025-07-04 | End: 2025-07-06 | Stop reason: HOSPADM

## 2025-07-04 RX ADMIN — SULFAMETHOXAZOLE AND TRIMETHOPRIM 1 TABLET: 800; 160 TABLET ORAL at 08:07

## 2025-07-04 RX ADMIN — ACETAMINOPHEN 650 MG: 325 TABLET, FILM COATED ORAL at 03:07

## 2025-07-04 RX ADMIN — ACETAMINOPHEN 650 MG: 325 TABLET, FILM COATED ORAL at 08:07

## 2025-07-04 RX ADMIN — IBUPROFEN 600 MG: 600 TABLET ORAL at 01:07

## 2025-07-04 RX ADMIN — OXYCODONE HYDROCHLORIDE 5 MG: 5 TABLET ORAL at 08:07

## 2025-07-04 RX ADMIN — OXYCODONE HYDROCHLORIDE 10 MG: 10 TABLET ORAL at 04:07

## 2025-07-04 RX ADMIN — ONDANSETRON HYDROCHLORIDE 4 MG: 4 TABLET, FILM COATED ORAL at 06:07

## 2025-07-04 RX ADMIN — MUPIROCIN: 20 OINTMENT TOPICAL at 08:07

## 2025-07-04 RX ADMIN — IBUPROFEN 600 MG: 600 TABLET ORAL at 06:07

## 2025-07-04 RX ADMIN — IBUPROFEN 600 MG: 600 TABLET ORAL at 12:07

## 2025-07-04 RX ADMIN — BUSPIRONE HYDROCHLORIDE 10 MG: 10 TABLET ORAL at 08:07

## 2025-07-04 RX ADMIN — BUSPIRONE HYDROCHLORIDE 10 MG: 10 TABLET ORAL at 03:07

## 2025-07-04 RX ADMIN — HYDROMORPHONE HYDROCHLORIDE 1 MG: 1 INJECTION, SOLUTION INTRAMUSCULAR; INTRAVENOUS; SUBCUTANEOUS at 10:07

## 2025-07-04 RX ADMIN — HYDROMORPHONE HYDROCHLORIDE 0.5 MG: 2 INJECTION INTRAMUSCULAR; INTRAVENOUS; SUBCUTANEOUS at 08:07

## 2025-07-04 RX ADMIN — DOCUSATE SODIUM 100 MG: 100 CAPSULE, LIQUID FILLED ORAL at 08:07

## 2025-07-04 RX ADMIN — LAMOTRIGINE 200 MG: 100 TABLET ORAL at 08:07

## 2025-07-04 RX ADMIN — OXYCODONE HYDROCHLORIDE 10 MG: 10 TABLET ORAL at 06:07

## 2025-07-04 RX ADMIN — OXYCODONE HYDROCHLORIDE 10 MG: 10 TABLET ORAL at 12:07

## 2025-07-04 RX ADMIN — MELATONIN TAB 3 MG 6 MG: 3 TAB at 08:07

## 2025-07-04 NOTE — NURSING
Pt lying in bed. Assessment completed;Pt complains of pain. Pt medicated by night nurse. Will reassess pain level. Call light within reach and bed lowered.

## 2025-07-04 NOTE — PROGRESS NOTES
[facility] ([address])  Obstetrics & Gynecology  Progress Note    Patient Name: Perla Mallory  MRN: [id number]  Admission Date: 7/1/2025  Primary Care Provider: Mya Qureshi MD (Inactive)  Principal Problem: Vulvar abscess    Subjective:     Interval History: NAEON.  She reports 10/10 vulvar pain. She received scheduled tylenol/ibuprofen, oxy 5mg x1, oxy 10mg x1, and dilauded 0.5mg overnight. She says that her pain had overall been getting better until this morning. Endorses mild nausea. Minimal appetite but denies emesis. Denies F/C.      Scheduled Meds:   acetaminophen  650 mg Oral Q6H    busPIRone  10 mg Oral TID    docusate sodium  100 mg Oral Daily    ibuprofen  600 mg Oral Q6H    lamoTRIgine  200 mg Oral Daily    lurasidone  40 mg Oral Daily    mupirocin   Nasal BID    sulfamethoxazole-trimethoprim 800-160mg  1 tablet Oral BID     Continuous Infusions:  PRN Meds:  Current Facility-Administered Medications:     HYDROmorphone, 0.5 mg, Intravenous, Q2H PRN    ondansetron, 4 mg, Oral, Q6H PRN    oxyCODONE, 5 mg, Oral, Q4H PRN    oxyCODONE, 10 mg, Oral, Q4H PRN    prochlorperazine, 5 mg, Intravenous, Q6H PRN    sodium chloride 0.9%, 10 mL, Intravenous, PRN    Review of patient's allergies indicates:  No Known Allergies    Objective:     Vital Signs (Most Recent):  Temp: 98.2 °F (36.8 °C) (07/04/25 0823)  Pulse: 96 (07/04/25 0815)  Resp: 18 (07/04/25 1029)  BP: 105/68 (07/04/25 0815)  SpO2: 100 % (07/04/25 0710) Vital Signs (24h Range):  Temp:  [98.2 °F (36.8 °C)-98.8 °F (37.1 °C)] 98.2 °F (36.8 °C)  Pulse:  [] 96  Resp:  [16-20] 18  SpO2:  [99 %-100 %] 100 %  BP: (100-112)/(58-78) 105/68     Weight: 61.2 kg (135 lb)  Body mass index is 23.91 kg/m².  No LMP recorded.    I&O (Last 24H):    Intake/Output Summary (Last 24 hours) at 7/4/2025 1038  Last data filed at 7/4/2025 0831  Gross per 24 hour   Intake 1000 ml   Output 400 ml   Net 600 ml     Physical Exam:   Constitutional: She is oriented  "to person, place, and time. She appears well-developed and well-nourished. No distress.    HENT:   Head: Normocephalic and atraumatic.      Cardiovascular:  Normal rate.             Pulmonary/Chest: Effort normal.        Abdominal: Soft. She exhibits no distension. There is no guarding.     Genitourinary:    Genitourinary Comments: Incision clean and draining serosanguinous fluid. Packing in place. Top layer of packing advanced from skin edge with brown discharge. Improved edema. No purulent discharge, foul smelling odor, or significant erythema. Appropriate tenderness to palpation half-way to ASIS consistent with packing.              Musculoskeletal: Normal range of motion.       Neurological: She is alert and oriented to person, place, and time.    Skin: Skin is warm and dry. No rash noted. She is not diaphoretic.    Psychiatric: She has a normal mood and affect. Her behavior is normal. Judgment and thought content normal.     Laboratory:  Recent Labs   Lab 07/01/25  1110 07/02/25  0323 07/03/25  0427   WBC 14.01* 17.10* 14.20*   HGB 13.7 13.2 12.0   HCT 41.4 39.6 37.4   MCV 93 92 93    172 181      Diagnostic Results:  CT AP (7/1/25): Reviewed  Impression:  "Partially imaged 2.0 cm subcutaneous cystic lesion in the right labia majora with extensive surrounding edema, concerning for abscess as clinically suspected.  Heterogeneous myometrial enhancement, which may be physiologic.  Other considerations include fibroids and adenomyosis.  Consider pelvic ultrasound."    Assessment/Plan:     Active Diagnoses:    Diagnosis Date Noted POA    PRINCIPAL PROBLEM:  Vulvar abscess s/p I&D [N76.4] 07/01/2025 Yes    Mood disorder [F39] 07/01/2025 Yes      Problems Resolved During this Admission:     Vulvar abscess, s/p I+D, POD 1  - VSS, last febrile to 100.4 F at 1930 on 7/1/25  - PE demonstrates appropriate postop tenderness. Incision clean and draining serosanguinous fluid. Skin is non-erythematous and edema is " appropriate in the setting of recent surgical procedure. Top layer of packing advanced from the skin edge with brown drainage. Decision made to remove packing. Patient in significant pain while removing packing.   - WBC 14 > 17 > 14 > 7   - Continue TMP-SMX   - Regular diet  - SCDs while in bed  - Inpatient consult to wound care for  for wound changes. Appreciate assistance and recommendations.  - Plan to premedicate patient and pack wound this morning. Will keep patient overnight for pain control.     Mood disorder  - Continue home meds; holding Adderall    Dispo: Continue wound management today and anticipate discharge POD#2-4 if pt meets postop milestones and tolerating dressing changes.     Margaret Wilde MD  Obstetrics & Gynecology, PGY-2

## 2025-07-05 PROCEDURE — 25000003 PHARM REV CODE 250

## 2025-07-05 PROCEDURE — 63600175 PHARM REV CODE 636 W HCPCS

## 2025-07-05 PROCEDURE — 99233 SBSQ HOSP IP/OBS HIGH 50: CPT | Mod: ,,, | Performed by: OBSTETRICS & GYNECOLOGY

## 2025-07-05 PROCEDURE — 11000001 HC ACUTE MED/SURG PRIVATE ROOM

## 2025-07-05 PROCEDURE — 25000003 PHARM REV CODE 250: Performed by: OBSTETRICS & GYNECOLOGY

## 2025-07-05 RX ADMIN — BUSPIRONE HYDROCHLORIDE 10 MG: 10 TABLET ORAL at 03:07

## 2025-07-05 RX ADMIN — OXYCODONE HYDROCHLORIDE 5 MG: 5 TABLET ORAL at 05:07

## 2025-07-05 RX ADMIN — IBUPROFEN 600 MG: 600 TABLET ORAL at 12:07

## 2025-07-05 RX ADMIN — MUPIROCIN: 20 OINTMENT TOPICAL at 09:07

## 2025-07-05 RX ADMIN — LAMOTRIGINE 200 MG: 100 TABLET ORAL at 08:07

## 2025-07-05 RX ADMIN — ACETAMINOPHEN 650 MG: 325 TABLET, FILM COATED ORAL at 08:07

## 2025-07-05 RX ADMIN — ONDANSETRON HYDROCHLORIDE 4 MG: 4 TABLET, FILM COATED ORAL at 12:07

## 2025-07-05 RX ADMIN — OXYCODONE HYDROCHLORIDE 10 MG: 10 TABLET ORAL at 08:07

## 2025-07-05 RX ADMIN — SULFAMETHOXAZOLE AND TRIMETHOPRIM 1 TABLET: 800; 160 TABLET ORAL at 08:07

## 2025-07-05 RX ADMIN — ACETAMINOPHEN 650 MG: 325 TABLET, FILM COATED ORAL at 03:07

## 2025-07-05 RX ADMIN — HYDROMORPHONE HYDROCHLORIDE 0.5 MG: 2 INJECTION INTRAMUSCULAR; INTRAVENOUS; SUBCUTANEOUS at 09:07

## 2025-07-05 RX ADMIN — BUSPIRONE HYDROCHLORIDE 10 MG: 10 TABLET ORAL at 08:07

## 2025-07-05 RX ADMIN — MUPIROCIN: 20 OINTMENT TOPICAL at 08:07

## 2025-07-05 RX ADMIN — MELATONIN TAB 3 MG 6 MG: 3 TAB at 08:07

## 2025-07-05 RX ADMIN — PROCHLORPERAZINE EDISYLATE 5 MG: 5 INJECTION INTRAMUSCULAR; INTRAVENOUS at 05:07

## 2025-07-05 RX ADMIN — HYDROMORPHONE HYDROCHLORIDE 0.5 MG: 2 INJECTION INTRAMUSCULAR; INTRAVENOUS; SUBCUTANEOUS at 02:07

## 2025-07-05 RX ADMIN — DOCUSATE SODIUM 100 MG: 100 CAPSULE, LIQUID FILLED ORAL at 08:07

## 2025-07-05 RX ADMIN — OXYCODONE HYDROCHLORIDE 5 MG: 5 TABLET ORAL at 12:07

## 2025-07-05 RX ADMIN — PROCHLORPERAZINE EDISYLATE 5 MG: 5 INJECTION INTRAMUSCULAR; INTRAVENOUS at 02:07

## 2025-07-05 RX ADMIN — IBUPROFEN 600 MG: 600 TABLET ORAL at 06:07

## 2025-07-05 RX ADMIN — IBUPROFEN 600 MG: 600 TABLET ORAL at 05:07

## 2025-07-05 RX ADMIN — OXYCODONE HYDROCHLORIDE 10 MG: 10 TABLET ORAL at 09:07

## 2025-07-05 NOTE — CARE UPDATE
Myself and Dr. Oleary to bedside for labial abscess packing change. Patient was pre-treated with 1.5 mg IV Dilaudid. Previous aquacel ag packing had fallen out overnight. Wound bed healthy appearing. Area repacked using 1 ribbon of Aquacel Ag Hydrofiber deep and 5-6 in of iodoform ribbon. 4 x 4 guaze folded and placed over wound. Paper tape used to keep gauze in place. Patient tolerated procedure well overall however endorsed severe pain. Will continue to monitor pain with PRN options and recheck packing this afternoon.      Adela Griffith MD  OB/GYN PGY-1

## 2025-07-05 NOTE — PROGRESS NOTES
[facility] ([address])  Obstetrics & Gynecology  Progress Note    Patient Name: Perla Mallory  MRN: [id number]  Admission Date: 7/1/2025  Primary Care Provider: Mya Qureshi MD (Inactive)  Principal Problem: Vulvar abscess    Subjective:     Interval History: NAEON.  She reports improvement in vulvar pain overnight. She received scheduled tylenol/ibuprofen, oxy 5mg x2, and dilauded 0.5mg overnight. Endorses mild nausea. Minimal appetite but denies emesis. Denies F/C.      Scheduled Meds:   acetaminophen  650 mg Oral Q6H    busPIRone  10 mg Oral TID    docusate sodium  100 mg Oral Daily    ibuprofen  600 mg Oral Q6H    lamoTRIgine  200 mg Oral Daily    lurasidone  40 mg Oral Daily    mupirocin   Nasal BID    sulfamethoxazole-trimethoprim 800-160mg  1 tablet Oral BID     Continuous Infusions:  PRN Meds:  Current Facility-Administered Medications:     HYDROmorphone, 0.5 mg, Intravenous, Q2H PRN    melatonin, 6 mg, Oral, Nightly PRN    ondansetron, 4 mg, Oral, Q6H PRN    oxyCODONE, 5 mg, Oral, Q4H PRN    oxyCODONE, 10 mg, Oral, Q4H PRN    prochlorperazine, 5 mg, Intravenous, Q6H PRN    sodium chloride 0.9%, 10 mL, Intravenous, PRN    Review of patient's allergies indicates:  No Known Allergies    Objective:     Vital Signs (Most Recent):  Temp: 97.9 °F (36.6 °C) (07/05/25 0431)  Pulse: 89 (07/05/25 0431)  Resp: 16 (07/05/25 0431)  BP: 98/61 (07/05/25 0431)  SpO2: 95 % (07/05/25 0431) Vital Signs (24h Range):  Temp:  [97.9 °F (36.6 °C)-98.8 °F (37.1 °C)] 97.9 °F (36.6 °C)  Pulse:  [78-97] 89  Resp:  [16-18] 16  SpO2:  [95 %-100 %] 95 %  BP: ()/(57-81) 98/61     Weight: 61.2 kg (135 lb)  Body mass index is 23.91 kg/m².  No LMP recorded.    I&O (Last 24H):    Intake/Output Summary (Last 24 hours) at 7/5/2025 0601  Last data filed at 7/4/2025 2034  Gross per 24 hour   Intake 350 ml   Output 900 ml   Net -550 ml     Physical Exam:   Constitutional: She is oriented to person, place, and time. She  "appears well-developed and well-nourished. No distress.    HENT:   Head: Normocephalic and atraumatic.      Cardiovascular:  Normal rate.             Pulmonary/Chest: Effort normal.        Abdominal: Soft. She exhibits no distension. There is no guarding.     Genitourinary:    Genitourinary Comments: Incision clean and draining serosanguinous fluid. Packing in place. Top layer of packing advanced from skin edge with brown discharge. Improved edema. No purulent discharge, foul smelling odor, or significant erythema. Appropriate tenderness to palpation half-way to ASIS consistent with packing.              Musculoskeletal: Normal range of motion.       Neurological: She is alert and oriented to person, place, and time.    Skin: Skin is warm and dry. No rash noted. She is not diaphoretic.    Psychiatric: She has a normal mood and affect. Her behavior is normal. Judgment and thought content normal.     Laboratory:  Recent Labs   Lab 07/02/25  0323 07/03/25  0427 07/04/25  1027   WBC 17.10* 14.20* 7.86   HGB 13.2 12.0 12.0   HCT 39.6 37.4 37.6   MCV 92 93 94    181 197      Diagnostic Results:  CT AP (7/1/25): Reviewed  Impression:  "Partially imaged 2.0 cm subcutaneous cystic lesion in the right labia majora with extensive surrounding edema, concerning for abscess as clinically suspected.  Heterogeneous myometrial enhancement, which may be physiologic.  Other considerations include fibroids and adenomyosis.  Consider pelvic ultrasound."    Assessment/Plan:     Active Diagnoses:    Diagnosis Date Noted POA    PRINCIPAL PROBLEM:  Vulvar abscess s/p I&D [N76.4] 07/01/2025 Yes    Mood disorder [F39] 07/01/2025 Yes      Problems Resolved During this Admission:     Vulvar abscess, s/p I+D  - VSS, last febrile to 100.4 F at 1930 on 7/1/25  - PE demonstrates appropriate postop tenderness. Incision clean and draining serosanguinous fluid. Skin is non-erythematous and edema is appropriate in the setting of recent surgical " procedure. Top layer of packing advanced from the skin edge with brown drainage.  - WBC 14 > 17 > 14 > 7   - Continue TMP-SMX   - Regular diet  - SCDs while in bed  - Inpatient consult to wound care for  for wound changes. Appreciate assistance and recommendations.  - Plan to premedicate patient and pack wound this morning.     Mood disorder  - Continue home meds; holding Adderall    Dispo: Continue wound management today and anticipate discharge today if pt meets postop milestones and tolerating dressing changes.     Margaret Wilde MD  Obstetrics & Gynecology, PGY-2

## 2025-07-05 NOTE — PLAN OF CARE
Problem: Adult Inpatient Plan of Care  Goal: Absence of Hospital-Acquired Illness or Injury  Outcome: Progressing     Problem: Adult Inpatient Plan of Care  Goal: Optimal Comfort and Wellbeing  Outcome: Progressing     Problem: Wound  Goal: Optimal Coping  Outcome: Progressing     Problem: Wound  Goal: Absence of Infection Signs and Symptoms  Outcome: Progressing     Problem: Wound  Goal: Optimal Pain Control and Function  Outcome: Progressing     Problem: Wound  Goal: Skin Health and Integrity  Outcome: Progressing     Problem: Wound  Goal: Optimal Wound Healing  Outcome: Progressing

## 2025-07-05 NOTE — CARE UPDATE
Afternoon Assessment:    Resident to bedside for PM check with nurse Mimi. She endorses pain that is well controlled with PO analgesia. Feeling much better overall. Nervous about packing changes and risk of packing expulsion overnight, so Mom and pt would feel more comfortable staying the night and discharging tomorrow.     Temp:  [97.9 °F (36.6 °C)-98.5 °F (36.9 °C)] 98.5 °F (36.9 °C)  Pulse:  [67-97] 67  Resp:  [12-17] 12  SpO2:  [95 %-100 %] 99 %  BP: ()/(57-74) 108/66    PE:Wound packing in place. 4x4 exchanged.       A/P: Continue to manage pain appropriately with the utilization of scheduled ibu/tyl as well as PRN options. Continue to monitor pads and 4x4 guaze.     Adela Griffith M.D.  Obstetrics and Gynecology  PGY-1

## 2025-07-06 VITALS
BODY MASS INDEX: 23.92 KG/M2 | RESPIRATION RATE: 16 BRPM | TEMPERATURE: 98 F | OXYGEN SATURATION: 99 % | DIASTOLIC BLOOD PRESSURE: 77 MMHG | HEIGHT: 63 IN | WEIGHT: 135 LBS | SYSTOLIC BLOOD PRESSURE: 116 MMHG | HEART RATE: 76 BPM

## 2025-07-06 LAB
BACTERIA BLD CULT: NORMAL
BACTERIA BLD CULT: NORMAL
C TRACH DNA SPEC QL NAA+PROBE: NOT DETECTED
CTGC SOURCE (OHS) ORD-325: NORMAL
N GONORRHOEA DNA UR QL NAA+PROBE: NOT DETECTED

## 2025-07-06 PROCEDURE — 63600175 PHARM REV CODE 636 W HCPCS: Performed by: STUDENT IN AN ORGANIZED HEALTH CARE EDUCATION/TRAINING PROGRAM

## 2025-07-06 PROCEDURE — 99232 SBSQ HOSP IP/OBS MODERATE 35: CPT | Mod: ,,, | Performed by: OBSTETRICS & GYNECOLOGY

## 2025-07-06 PROCEDURE — 25000003 PHARM REV CODE 250

## 2025-07-06 PROCEDURE — 63600175 PHARM REV CODE 636 W HCPCS

## 2025-07-06 RX ORDER — DOCUSATE SODIUM 100 MG/1
100 CAPSULE, LIQUID FILLED ORAL 2 TIMES DAILY
Qty: 30 CAPSULE | Refills: 1 | Status: SHIPPED | OUTPATIENT
Start: 2025-07-06

## 2025-07-06 RX ORDER — SULFAMETHOXAZOLE AND TRIMETHOPRIM 800; 160 MG/1; MG/1
1 TABLET ORAL 2 TIMES DAILY
Qty: 28 TABLET | Refills: 0 | Status: SHIPPED | OUTPATIENT
Start: 2025-07-06 | End: 2025-07-20

## 2025-07-06 RX ORDER — IBUPROFEN 600 MG/1
600 TABLET, FILM COATED ORAL EVERY 6 HOURS
Qty: 60 TABLET | Refills: 1 | Status: SHIPPED | OUTPATIENT
Start: 2025-07-06

## 2025-07-06 RX ORDER — HYDROMORPHONE HYDROCHLORIDE 1 MG/ML
0.2 INJECTION, SOLUTION INTRAMUSCULAR; INTRAVENOUS; SUBCUTANEOUS
Status: DISCONTINUED | OUTPATIENT
Start: 2025-07-06 | End: 2025-07-06 | Stop reason: HOSPADM

## 2025-07-06 RX ORDER — HYDROMORPHONE HYDROCHLORIDE 2 MG/ML
1.5 INJECTION, SOLUTION INTRAMUSCULAR; INTRAVENOUS; SUBCUTANEOUS ONCE
Status: COMPLETED | OUTPATIENT
Start: 2025-07-06 | End: 2025-07-06

## 2025-07-06 RX ORDER — ACETAMINOPHEN 500 MG
1000 TABLET ORAL EVERY 6 HOURS PRN
Status: DISCONTINUED | OUTPATIENT
Start: 2025-07-06 | End: 2025-07-06 | Stop reason: HOSPADM

## 2025-07-06 RX ORDER — KETOROLAC TROMETHAMINE 30 MG/ML
15 INJECTION, SOLUTION INTRAMUSCULAR; INTRAVENOUS EVERY 6 HOURS PRN
Status: DISCONTINUED | OUTPATIENT
Start: 2025-07-06 | End: 2025-07-06 | Stop reason: HOSPADM

## 2025-07-06 RX ORDER — OXYCODONE HYDROCHLORIDE 5 MG/1
5 TABLET ORAL EVERY 4 HOURS PRN
Qty: 20 TABLET | Refills: 0 | Status: SHIPPED | OUTPATIENT
Start: 2025-07-06 | End: 2025-07-14

## 2025-07-06 RX ORDER — ONDANSETRON HYDROCHLORIDE 2 MG/ML
4 INJECTION, SOLUTION INTRAVENOUS EVERY 4 HOURS PRN
Status: DISCONTINUED | OUTPATIENT
Start: 2025-07-06 | End: 2025-07-06 | Stop reason: HOSPADM

## 2025-07-06 RX ORDER — DEXTROMETHORPHAN HYDROBROMIDE, GUAIFENESIN 5; 100 MG/5ML; MG/5ML
650 LIQUID ORAL EVERY 6 HOURS
Qty: 60 TABLET | Refills: 1 | Status: SHIPPED | OUTPATIENT
Start: 2025-07-06

## 2025-07-06 RX ADMIN — LAMOTRIGINE 200 MG: 100 TABLET ORAL at 09:07

## 2025-07-06 RX ADMIN — OXYCODONE HYDROCHLORIDE 10 MG: 10 TABLET ORAL at 04:07

## 2025-07-06 RX ADMIN — IBUPROFEN 600 MG: 600 TABLET ORAL at 12:07

## 2025-07-06 RX ADMIN — DOCUSATE SODIUM 100 MG: 100 CAPSULE, LIQUID FILLED ORAL at 09:07

## 2025-07-06 RX ADMIN — ACETAMINOPHEN 650 MG: 325 TABLET, FILM COATED ORAL at 09:07

## 2025-07-06 RX ADMIN — OXYCODONE HYDROCHLORIDE 10 MG: 10 TABLET ORAL at 01:07

## 2025-07-06 RX ADMIN — OXYCODONE HYDROCHLORIDE 5 MG: 5 TABLET ORAL at 09:07

## 2025-07-06 RX ADMIN — ACETAMINOPHEN 650 MG: 325 TABLET, FILM COATED ORAL at 02:07

## 2025-07-06 RX ADMIN — BUSPIRONE HYDROCHLORIDE 10 MG: 10 TABLET ORAL at 09:07

## 2025-07-06 RX ADMIN — SULFAMETHOXAZOLE AND TRIMETHOPRIM 1 TABLET: 800; 160 TABLET ORAL at 09:07

## 2025-07-06 RX ADMIN — IBUPROFEN 600 MG: 600 TABLET ORAL at 04:07

## 2025-07-06 RX ADMIN — HYDROMORPHONE HYDROCHLORIDE 0.5 MG: 2 INJECTION INTRAMUSCULAR; INTRAVENOUS; SUBCUTANEOUS at 09:07

## 2025-07-06 RX ADMIN — HYDROMORPHONE HYDROCHLORIDE 1.5 MG: 2 INJECTION INTRAMUSCULAR; INTRAVENOUS; SUBCUTANEOUS at 11:07

## 2025-07-06 NOTE — PLAN OF CARE
Case Management Final Discharge Note    Discharge Disposition: Home/Wound Care ([facility])    New DME ordered / company name: None    Relevant SDOH / Transition of Care Barriers:  None    Person available to provide assistance at home when needed and their contact information: Self    Scheduled followup appointment: mani Gunn. Scheduled for 07/17/25    Referrals placed: [facility]    Transportation: Mom to transport patient home        Patient educated on discharge services and updated on DC plan. Bedside RN notified, patient clear to discharge from Case Management Perspective.      07/06/25 1421   Final Note   Hospital Resources/Appts/Education Provided Provided patient/caregiver with written discharge plan information   Post-Acute Status   Discharge Delays None known at this time     [facility] ([address])  Discharge Final Note    Primary Care Provider: Mya Qureshi MD (Inactive)    Expected Discharge Date: 7/6/2025    Final Discharge Note (most recent)       Final Note - 07/06/25 1421          Final Note    Hospital Resources/Appts/Education Provided Provided patient/caregiver with written discharge plan information (P)         Post-Acute Status    Discharge Delays None known at this time (P)                      Important Message from Medicare             Contact Info       [facility]   Specialty: Obstetrics and Gynecology    [address] [address]  [facility] [address]   Phone: [phone]       Next Steps: Follow up on 7/17/2025    Instructions: Post-op follow up/wound check    [facility]    [address], [place] [address]  [phone]       Next Steps: Call    Instructions: for wound care---plan to see patient on Monday, 7/7.

## 2025-07-06 NOTE — PLAN OF CARE
Problem: Wound  Goal: Absence of Infection Signs and Symptoms  Outcome: Progressing     Problem: Wound  Goal: Optimal Pain Control and Function  Outcome: Progressing     Problem: Wound  Goal: Skin Health and Integrity  Outcome: Progressing     Problem: Fall Injury Risk  Goal: Absence of Fall and Fall-Related Injury  Outcome: Progressing

## 2025-07-06 NOTE — DISCHARGE SUMMARY
Discharge Summary  Gynecology      Admit Date: 7/1/2025    Discharge Date and Time: 7/6/2025     Attending Physician: Kim Rizvi MD    Principal Diagnoses: Vulvar abscess    Active Hospital Problems    Diagnosis  POA    *Vulvar abscess s/p I&D [N76.4]  Yes    Mood disorder [F39]  Yes      Resolved Hospital Problems   No resolved problems to display.       Procedures: Procedure(s) (LRB):  INCISION AND DRAINAGE, LABIA (Right)    Discharged Condition: good    Hospital Course:   Perla Mallory is a 24 y.o. y.o. G0 female who presented to the ED on 7/1/2025 for a worsening vulvar abscess. She was started on broad spectrum IV antibiotics and taken to the OR for incision and drainage. Patient tolerated procedure well. Post-operative course was uncomplicated. She had one fever to 100.4 at 1930 on 7/1, so so was continued on broad spectrum IV abx for 48 hours. Postop pain was controlled with scheduled ibuprofen and tylenol with PRN oxycodone 5/10. Before first packing change (iodoform) on 7/2 she was pretreated with 1mg dilaudid, but still had severe pain. Lopez was left in place to allow minimal movement in an attempt to allow swelling to reduce and decrease pain. On 7/3 and 7/5, pt was pretreated with 1.5mg dilaudid and tolerated packing change (this time with Aquacel Ag Hydrofiber gauze) well. Wound care was consulted to assist with packing change and provide recommendations. Unfortunately, her new packing was expelled from the wound two nights in a row, so it was repacked with a combination of Aquacel ag for deep areas and Iodoform ribbon for more superficial areas. The wound was then covered with a 4x4 guaze and taped in place.     On day of discharge, patient was urinating, ambulating, and tolerating a regular diet without difficulty. Pain was well controlled on PO medication. She was discharged home on POD#5 in stable condition with instructions to follow up in clinic in 2 weeks. She has also been set up with  the home wound care team who will call her and plan to see her tomorrow.     Consults: Wound care    Significant Diagnostic Studies:  Recent Labs   Lab 07/02/25  0323 07/03/25  0427 07/04/25  1027   WBC 17.10* 14.20* 7.86   HGB 13.2 12.0 12.0   HCT 39.6 37.4 37.6   MCV 92 93 94    181 197        Treatments:   1. Surgery as above    Disposition: Home or Self Care with home wound care team follow up    Patient Instructions:   Current Discharge Medication List        START taking these medications    Details   acetaminophen (TYLENOL) 650 MG TbSR Take 1 tablet (650 mg total) by mouth every 6 (six) hours. Alternate between ibuprofen and tylenol every 3 hours. For example: @0800: ibuprofen 600mg @1100: tylenol 650mg @1400: ibuprofen 600mg @1700: tylenol 650 mg @2000: ibuprofen 600mg  Qty: 60 tablet, Refills: 1      docusate sodium (COLACE) 100 MG capsule Take 1 capsule (100 mg total) by mouth 2 (two) times daily.  Qty: 30 capsule, Refills: 1      ibuprofen (ADVIL,MOTRIN) 600 MG tablet Take 1 tablet (600 mg total) by mouth every 6 (six) hours.  Qty: 60 tablet, Refills: 1      oxyCODONE (ROXICODONE) 5 MG immediate release tablet Take 1 tablet (5 mg total) by mouth every 4 (four) hours as needed for Pain.  Qty: 20 tablet, Refills: 0    Comments: Quantity prescribed more than 7 day supply? No  Associated Diagnoses: Vulvar abscess      sulfamethoxazole-trimethoprim 800-160mg (BACTRIM DS) 800-160 mg Tab Take 1 tablet by mouth 2 (two) times daily. for 14 days  Qty: 28 tablet, Refills: 0           CONTINUE these medications which have NOT CHANGED    Details   busPIRone (BUSPAR) 10 MG tablet Take 10 mg by mouth 3 (three) times daily.      dextroamphetamine-amphetamine (ADDERALL) 10 mg Tab Take 1 tablet by mouth every evening.      dextroamphetamine-amphetamine (ADDERALL) 15 mg tablet Take 1 tablet by mouth every morning.      lamoTRIgine (LAMICTAL) 200 MG tablet Take 200 mg by mouth once daily.      levonorgestreL (MIRENA)  52 mg IUD 52,000 mcg by Intrauterine route.      lurasidone (LATUDA) 40 mg Tab tablet Take 40 mg by mouth once daily.           STOP taking these medications       benzonatate (TESSALON) 200 MG capsule Comments:   Reason for Stopping:         hydrocortisone 2.5 % cream Comments:   Reason for Stopping:         predniSONE (DELTASONE) 20 MG tablet Comments:   Reason for Stopping:               Discharge Procedure Orders   Diet general     Diet Adult Regular     Diet general     Lifting restrictions   Order Comments: PELVIC REST:  No douching, tampons, or intercourse for 6 weeks.      DRIVING:  No driving while on narcotics. Driving may be resumed initially with a competent passenger one to two weeks after surgery if no longer taking narcotics.     Other restrictions (specify):   Order Comments: No alcoholic beverages while taking narcotic pain medications.  Do not drive while taking narcotic pain medication.    No douching, tampons, or intercourse for six weeks if you had any kind of procedure performed in the outside or inside of your vagina.      Avoid straining with bowel movements and/or constipation.     Wound care routine (specify)   Order Comments: VULVAR CARE:  After urinating or having a bowel movement, do the following to clean and dry the area of your incisions   - Use a clean, soft towel to pat dry the area    -Avoid wearing tight-fitting clothing over your incisions.  -You may use an ice pack on your vulva for up to 10 minutes at a time to help with swelling. Avoid placing ice directly on your skin.     DRESSING CARE(IF YOU HAVE A DRESSING): Keep wound clean and dry. Change packing every 2-5 days or per home wound care team recommendations. Change pad as needed to keep area clean. Wear loose fitting underwear and avoid spandex material.     Call MD for:  temperature >100.4     Call MD for:  persistent nausea and vomiting     Call MD for:  severe uncontrolled pain     Call MD for:  difficulty breathing,  headache or visual disturbances     Call MD for:  redness, tenderness, or signs of infection (pain, swelling, redness, odor or green/yellow discharge around incision site)     Call MD for:  hives     Call MD for:   Order Comments: Inability to void,urine is ketchup colored or you have large clots, vaginal bleeding is heavier than a period.    VAGINAL DISCHARGE: Contact your surgical team if you develop vaginal or vulvar irritation along with a discharge.  Also contact your surgical team if you have vaginal discharge that smells like urine or stool.    CONSTIPATION REMEDIES: Patients are often constipated after surgery or with use of oral narcotic medicine. You should continue to take the stool softener, Senokot-S during the next six weeks, and consume adequate amounts of water.  If you have not had a bowel movement for 3 days after dismissal, or are uncomfortable and unable to pass stool, please try one or all of the following measures:  1.  Milk of Magnesia - 30 cc by mouth every 12 hours   2.  Dulcolax suppository - One suppository per rectum every 4-6 hours   3.  Metamucil, Fibercon or other bulk former - use as directed  4.  Fleets Enema  5.  Prunes or Prune juice    If you continue to have constipation after trying the above remedies, you should contact your surgical team using the contact information listed above      PAIN MEDICATIONS:     Take your pain medications as instructed. It is best to take pain medications before your pain becomes severe. This will allow you to take less medication yet have better pain relief. For the first 2 or 3 days it may be helpful to take your pain medications on a regular schedule (e.g. every 4 to 6 hours). This will help you to keep your pain under better control. You should then begin to take fewer medications each day until you no longer need them. Do not take pain medication on an empty stomach. This may lead to nausea and vomiting.     Lifting restrictions   Order  Comments: If : No lifting >10lbs for 6 weeks     Other restrictions (specify):   Scheduling Instructions: Keep area clean/dry. No bathing/swimming until cleared after GYN visit.     No driving until:   Order Comments: No driving on narcotics. No driving until able to react quickly. For example: slam on breaks.     Pelvic Rest   Order Comments: Pelvic rest x 6 weeks.     Notify your health care provider if you experience any of the following:  temperature >100.4     Notify your health care provider if you experience any of the following:  persistent nausea and vomiting or diarrhea     Notify your health care provider if you experience any of the following:  severe uncontrolled pain     Notify your health care provider if you experience any of the following:  redness, tenderness, or signs of infection (pain, swelling, redness, odor or green/yellow discharge around incision site)     Notify your health care provider if you experience any of the following:  difficulty breathing or increased cough     Notify your health care provider if you experience any of the following:  severe persistent headache     Notify your health care provider if you experience any of the following:  worsening rash     Notify your health care provider if you experience any of the following:  persistent dizziness, light-headedness, or visual disturbances     Notify your health care provider if you experience any of the following:  increased confusion or weakness     Notify your health care provider if you experience any of the following:   Order Comments: Notify provider if soaking through 1 pad an hour.     Leave dressing on - Keep it clean, dry, and intact until clinic visit     Change dressing (specify)   Order Comments: Home health will change dressings. If dressing falls out, allow home health to replace it at next visit.     Lifting restrictions   Order Comments: LIFTING:  No lifting greater than 15 pounds for six weeks.      PELVIC REST:  No douching, tampons, or intercourse for 6 weeks.  If prescribed, vaginal estrogen cream may be used during the postoperative period.     Other restrictions (specify):   Order Comments: DRIVING:  No driving while on narcotics. Driving may be resumed initially with a competent passenger one to two weeks after surgery if no longer taking narcotics.     EXERCISE:  For six weeks your exercise should be limited to walking. You may walk as far as you wish, as long as you increase your level of exertion gradually and avoid slippery surfaces. You may climb stairs as needed to get around, but should not use stair climbing for exercise.     Leave dressing on - Keep it clean, dry, and intact until clinic visit     Remove dressing in 24 hours   Order Comments: If you have a bandage on wound, you may remove it the day after dismissal.  If you had steri-strips remove them once they begin to peel off (usually 2 weeks). Keep incision clean and dry.  Inspect the incision daily for signs and symptoms of infection.     Call MD for:  temperature >100.4     Call MD for:  persistent nausea and vomiting     Call MD for:  severe uncontrolled pain     Call MD for:  difficulty breathing, headache or visual disturbances     Call MD for:  redness, tenderness, or signs of infection (pain, swelling, redness, odor or green/yellow discharge around incision site)     Call MD for:  hives     Call MD for:   Order Comments: inability to void,urine is ketchup colored or you have large clots, vaginal bleeding is heavier than a period.    VAGINAL DISCHARGE: You may develop a vaginal discharge and intermittent vaginal spotting after surgery and up to 6 weeks postoperatively.  The discharge may have an odor and may change in color but it is normal.  This is due to dissolving stiches.  Contact your surgical team if you develop vaginal or vulvar irritation along with a discharge.  Also contact your surgical team if you have vaginal  discharge that smells like urine or stool.    PAIN MEDICATIONS:     Take your pain medications as instructed. It is best to take pain medications before your pain becomes severe. This will allow you to take less medication yet have better pain relief. For the first 2 or 3 days it may be helpful to take your pain medications on a regular schedule (e.g. every 4 to 6 hours). This will help you to keep your pain under better control. You should then begin to take fewer medications each day until you no longer need them. Do not take pain medication on an empty stomach. This may lead to nausea and vomiting.    CONSTIPATION REMEDIES: Patients are often constipated after surgery or with use of oral narcotic medicine. You should continue to take the stool softener, Senokot-S during the next six weeks, and consume adequate amounts of water.  If you have not had a bowel movement for 3 days after dismissal, or are uncomfortable and unable to pass stool, please try one or all of the following measures:  1.  Milk of Magnesia - 30 cc by mouth every 12 hours   2.  Dulcolax suppository - One suppository per rectum every 4-6 hours   3.  Metamucil, Fibercon or other bulk former - use as directed  4.  Fleets Enema  5.  Prunes or Prune juice    If you continue to have constipation after trying the above remedies, you should contact your surgical team using the contact information listed above     Activity as tolerated   Order Comments: Return to normal activity slowly as you feel able.     Shower on day dressing removed (No bath)   Order Comments: You may shower at any time but should avoid immersing in water for at least 6 weeks or until the wound is completely healed.  If given, please shower with Hibaclens soap until bottle is completely finish        Follow-up Information       [facility] Follow up on 7/17/2025.    Specialty: Obstetrics and Gynecology  Why: Post-op follow up/wound check  Contact information:  [address]  [facility] [address]  [phone]             [facility] [place] [facility]. Call.    Why: for wound care---plan to see patient on Monday, 7/7.  Contact information:  [address] [facility] [address], [place] [address]  [phone]                          Amberly Ramírez MD  OBGYN PGY-2

## 2025-07-06 NOTE — CARE UPDATE
Resident to bedside with OB Staff, Dr. Latif, to change packing and discuss discharge plan.     Patient premedicated with dilaudid 1.5mg.    Patient notes continued discomfort at right labia. Both layers of packing noted to be dislodged this morning.     Incision gently probed. Deep layer of packing placed. No superficial layer placed as deep layer filled cavity. Patient tolerated procedure well. Discussed discharge today with plan to have  tomorrow. Questions answered. Will discharge today.       Amberly Ramírez MD  OBGYN PGY-2

## 2025-07-06 NOTE — PROGRESS NOTES
[facility] ([address])  Obstetrics & Gynecology  Progress Note    Patient Name: Perla Mallory  MRN: [id number]  Admission Date: 7/1/2025  Primary Care Provider: Mya Qureshi MD (Inactive)  Principal Problem: Vulvar abscess    Subjective:     Interval History: NAEON.  She reports improvement in vulvar pain overnight. Endorses good appetitive, no N/V. Denies F/C. Superficial packing fell out overnight, but deep packing stayed in place. Pad and 4x4 guaze replaced, deep packing (Aquacel) left in place but superficial packing (Iodoform) was removed.     Scheduled Meds:   acetaminophen  650 mg Oral Q6H    busPIRone  10 mg Oral TID    docusate sodium  100 mg Oral Daily    ibuprofen  600 mg Oral Q6H    lamoTRIgine  200 mg Oral Daily    lurasidone  40 mg Oral Daily    mupirocin   Nasal BID    sulfamethoxazole-trimethoprim 800-160mg  1 tablet Oral BID     Continuous Infusions:  PRN Meds:  Current Facility-Administered Medications:     HYDROmorphone, 0.5 mg, Intravenous, Q2H PRN    melatonin, 6 mg, Oral, Nightly PRN    ondansetron, 4 mg, Oral, Q6H PRN    oxyCODONE, 5 mg, Oral, Q4H PRN    oxyCODONE, 10 mg, Oral, Q4H PRN    prochlorperazine, 5 mg, Intravenous, Q6H PRN    sodium chloride 0.9%, 10 mL, Intravenous, PRN    Review of patient's allergies indicates:  No Known Allergies    Objective:     Vital Signs (Most Recent):  Temp: 98.3 °F (36.8 °C) (07/06/25 0459)  Pulse: 84 (07/06/25 0447)  Resp: 16 (07/06/25 0459)  BP: (!) 105/58 (07/06/25 0447)  SpO2: 99 % (07/06/25 0447) Vital Signs (24h Range):  Temp:  [97.9 °F (36.6 °C)-98.5 °F (36.9 °C)] 98.3 °F (36.8 °C)  Pulse:  [67-89] 84  Resp:  [12-18] 16  SpO2:  [97 %-100 %] 99 %  BP: (101-108)/(56-68) 105/58     Weight: 61.2 kg (135 lb)  Body mass index is 23.91 kg/m².  No LMP recorded.    I&O (Last 24H):    Intake/Output Summary (Last 24 hours) at 7/6/2025 0546  Last data filed at 7/5/2025 0729  Gross per 24 hour   Intake --   Output 0 ml   Net 0 ml     Physical  "Exam:   Constitutional: She is oriented to person, place, and time. She appears well-developed and well-nourished. No distress.    HENT:   Head: Normocephalic and atraumatic.      Cardiovascular:  Normal rate.             Pulmonary/Chest: Effort normal.        Abdominal: Soft. She exhibits no distension. There is no guarding.     Genitourinary:    Genitourinary Comments: Incision clean and draining serosanguinous fluid. Aquacel ag packing in place. Improved edema. No purulent discharge, foul smelling odor, or significant erythema. Appropriate tenderness to palpation.              Musculoskeletal: Normal range of motion.       Neurological: She is alert and oriented to person, place, and time.    Skin: Skin is warm and dry. No rash noted. She is not diaphoretic.    Psychiatric: She has a normal mood and affect. Her behavior is normal. Judgment and thought content normal.     Laboratory:  Recent Labs   Lab 07/02/25  0323 07/03/25  0427 07/04/25  1027   WBC 17.10* 14.20* 7.86   HGB 13.2 12.0 12.0   HCT 39.6 37.4 37.6   MCV 92 93 94    181 197      Diagnostic Results:  CT AP (7/1/25): Reviewed  Impression:  "Partially imaged 2.0 cm subcutaneous cystic lesion in the right labia majora with extensive surrounding edema, concerning for abscess as clinically suspected.  Heterogeneous myometrial enhancement, which may be physiologic.  Other considerations include fibroids and adenomyosis.  Consider pelvic ultrasound."    Assessment/Plan:     Active Diagnoses:    Diagnosis Date Noted POA    PRINCIPAL PROBLEM:  Vulvar abscess s/p I&D [N76.4] 07/01/2025 Yes    Mood disorder [F39] 07/01/2025 Yes      Problems Resolved During this Admission:     Vulvar abscess, s/p I+D  - VSS, last febrile to 100.4 F at 1930 on 7/1/25  - PE demonstrates appropriate postop tenderness. Incision clean and draining serosanguinous fluid. Skin is non-erythematous and edema is appropriate in the setting of recent surgical procedure. Top layer of " packing intact.   - WBC 14 > 17 > 14 > 7   - Continue PO TMP-SMX   - Regular diet  - SCDs while in bed  - s/p inpatient consult to wound care for  for wound changes. Appreciate assistance and recommendations.  - Plan to premedicate patient and re-pack superficial aspect of wound this morning with iodoform ribbon and securely tape it down using a 4x4 and silk tape before discharge.     Mood disorder  - Continue home meds; holding Adderall    Dispo: Continue wound management and anticipate discharge today.    Adela Griffith M.D.  Obstetrics and Gynecology  PGY-1

## 2025-07-07 ENCOUNTER — TELEPHONE (OUTPATIENT)
Dept: OBSTETRICS AND GYNECOLOGY | Facility: CLINIC | Age: 25
End: 2025-07-07
Payer: COMMERCIAL

## 2025-07-07 ENCOUNTER — PATIENT MESSAGE (OUTPATIENT)
Dept: OBSTETRICS AND GYNECOLOGY | Facility: CLINIC | Age: 25
End: 2025-07-07
Payer: COMMERCIAL

## 2025-07-07 DIAGNOSIS — N76.4 VULVAR ABSCESS: Primary | ICD-10-CM

## 2025-07-07 LAB — BACTERIA SPEC ANAEROBE CULT: NORMAL

## 2025-07-07 RX ORDER — OXYCODONE AND ACETAMINOPHEN 5; 325 MG/1; MG/1
1 TABLET ORAL EVERY 4 HOURS PRN
Qty: 15 TABLET | Refills: 0 | Status: SHIPPED | OUTPATIENT
Start: 2025-07-07

## 2025-07-07 RX ORDER — ONDANSETRON 4 MG/1
8 TABLET, ORALLY DISINTEGRATING ORAL EVERY 8 HOURS
Qty: 60 TABLET | Refills: 3 | Status: SHIPPED | OUTPATIENT
Start: 2025-07-07

## 2025-07-07 NOTE — TELEPHONE ENCOUNTER
Copied from CRM #[id number]. Topic: General Inquiry - Information Request  >> Jul 7, 2025 12:01 PM Livia wrote:  Type: Patient Call Back    Who called:jaz with [employer] [phone]    What is the request in detail:requesting to get authorization for 7/1/25 surgery for staff removal. Coding was incorrect. Please call jaz    Can the clinic reply by [facility]?    Would the patient rather a call back or a response via [facility]? call    Best call back number:    Additional Information:

## 2025-07-07 NOTE — TELEPHONE ENCOUNTER
Let pt know that we have called the pharmacy and were able to get her prescription situation taken care of at the pharmacy and that they are working on getting the prescription filled.

## 2025-07-07 NOTE — TELEPHONE ENCOUNTER
Called and spoke to patient and her mother  Wound care can now come tomorrow. Packing fell out this morning. Reassured them that it is ok to wait til tomorrow to repack. Bactrim denied by [facility] per insurance claiming that she already picked it up from [facility]. She did not. Will call [facility] and override this. For pain control, the mother is worried that she will run out of oxy IRs before the next change and that the pain control is not lasting all the way til the next four hour vanessa. Recommended oxy IR 10s for the wound changes, but otherwise percocets q4-6 throughout the day for pain control with scheduled ibuprofen. Zofran sent per request as well

## 2025-07-07 NOTE — TELEPHONE ENCOUNTER
Copied from CRM #[id number]. Topic: General Inquiry - Patient Advice  >> Jul 7, 2025  9:20 AM Ursula wrote:  Name of Who is Calling: Bianka / pt mom [phone]      What is the request in detail:pt mom is asking if Dr Rizvi can call her back. She was seen over the weekend by Dr Rizvi. Her mom states that her daughter was unable to get the antibiotics filled bc of the holiday weekend. Her pharmacy states that they are unable to fill the antibiotics bc they were sent to the hospital pharmacy was closed . She does have an open wound and is asking if you can please send to :[id number] [place] [facility] [address]   Phone: [phone]  Fax: [phone]    She also states that she called home health this morning. They are unable to come out today. She says that the wound needs to be repacked.  She is asking for a call back asap to discuss the wound care. She is also states that she is in a lot of pain and would like to discuss pain management for her. Please have Dr Rizvi call mom asap to assist         Can the clinic reply by [facility] call       What Number to Call Back if not in [facility]: Mom / Bianka  [phone]

## 2025-07-09 ENCOUNTER — PATIENT OUTREACH (OUTPATIENT)
Dept: ADMINISTRATIVE | Facility: CLINIC | Age: 25
End: 2025-07-09
Payer: COMMERCIAL

## 2025-07-11 ENCOUNTER — TELEPHONE (OUTPATIENT)
Dept: OBSTETRICS AND GYNECOLOGY | Facility: CLINIC | Age: 25
End: 2025-07-11
Payer: COMMERCIAL

## 2025-07-11 LAB
BACTERIAL VAGINOSIS DNA (OHS): DETECTED
CANDIDA GLABRATA/KRUSEI DNA (OHS): NOT DETECTED
CANDIDA SPECIES DNA (OHS): NOT DETECTED
TRICHOMONAS VAGINALIS DNA (OHS): NOT DETECTED

## 2025-07-11 NOTE — TELEPHONE ENCOUNTER
Tried calling the number that was given for the insurance company.   It is only a recorder ask questions and asking for ID number and only have reference number that was provided in the message and it is not working. Will have to wait till they call again.

## 2025-07-11 NOTE — TELEPHONE ENCOUNTER
Copied from CRM #[id number]. Topic: General Inquiry - Patient Advice  >> Jul 9, 2025  2:56 PM Cynthia wrote:  Name of Who is Calling: TRACY      What is the request in detail:[employer] called to do a authorization for a recent procedure and impatient stay on 7/1.Please contact to further discuss and advise.           Can the clinic reply by [facility]:       What Number to Call Back if not in [facility]: TRACY [phone] reference# [id number]

## 2025-07-13 ENCOUNTER — PATIENT MESSAGE (OUTPATIENT)
Dept: OBSTETRICS AND GYNECOLOGY | Facility: CLINIC | Age: 25
End: 2025-07-13
Payer: COMMERCIAL

## 2025-07-14 DIAGNOSIS — N76.4 VULVAR ABSCESS: ICD-10-CM

## 2025-07-14 RX ORDER — OXYCODONE AND ACETAMINOPHEN 5; 325 MG/1; MG/1
1 TABLET ORAL EVERY 4 HOURS PRN
Qty: 15 TABLET | Refills: 0 | Status: SHIPPED | OUTPATIENT
Start: 2025-07-14

## 2025-07-15 ENCOUNTER — PATIENT MESSAGE (OUTPATIENT)
Dept: OBSTETRICS AND GYNECOLOGY | Facility: CLINIC | Age: 25
End: 2025-07-15
Payer: COMMERCIAL

## 2025-07-16 RX ORDER — FLUCONAZOLE 150 MG/1
150 TABLET ORAL
Qty: 3 TABLET | Refills: 0 | Status: SHIPPED | OUTPATIENT
Start: 2025-07-16 | End: 2025-07-23

## 2025-07-17 ENCOUNTER — TELEPHONE (OUTPATIENT)
Dept: OBSTETRICS AND GYNECOLOGY | Facility: CLINIC | Age: 25
End: 2025-07-17
Payer: COMMERCIAL

## 2025-07-17 ENCOUNTER — OFFICE VISIT (OUTPATIENT)
Dept: OBSTETRICS AND GYNECOLOGY | Facility: CLINIC | Age: 25
End: 2025-07-17
Payer: COMMERCIAL

## 2025-07-17 VITALS
HEIGHT: 63 IN | SYSTOLIC BLOOD PRESSURE: 120 MMHG | BODY MASS INDEX: 23.83 KG/M2 | WEIGHT: 134.5 LBS | DIASTOLIC BLOOD PRESSURE: 76 MMHG

## 2025-07-17 DIAGNOSIS — N76.4 VULVAR ABSCESS: Primary | ICD-10-CM

## 2025-07-17 PROCEDURE — 99024 POSTOP FOLLOW-UP VISIT: CPT | Mod: S$GLB,,, | Performed by: OBSTETRICS & GYNECOLOGY

## 2025-07-17 PROCEDURE — 3078F DIAST BP <80 MM HG: CPT | Mod: CPTII,S$GLB,, | Performed by: OBSTETRICS & GYNECOLOGY

## 2025-07-17 PROCEDURE — 3074F SYST BP LT 130 MM HG: CPT | Mod: CPTII,S$GLB,, | Performed by: OBSTETRICS & GYNECOLOGY

## 2025-07-17 PROCEDURE — 1160F RVW MEDS BY RX/DR IN RCRD: CPT | Mod: CPTII,S$GLB,, | Performed by: OBSTETRICS & GYNECOLOGY

## 2025-07-17 PROCEDURE — 99999 PR PBB SHADOW E&M-EST. PATIENT-LVL III: CPT | Mod: PBBFAC,,,

## 2025-07-17 PROCEDURE — 1159F MED LIST DOCD IN RCRD: CPT | Mod: CPTII,S$GLB,, | Performed by: OBSTETRICS & GYNECOLOGY

## 2025-07-17 RX ORDER — KETOROLAC TROMETHAMINE 10 MG/1
10 TABLET, FILM COATED ORAL EVERY 6 HOURS PRN
Qty: 20 TABLET | Refills: 0 | Status: SHIPPED | OUTPATIENT
Start: 2025-07-17 | End: 2025-07-22

## 2025-07-17 NOTE — PROGRESS NOTES
"[facility]  Obstetrics and Gynecology     Past medical, surgical, social, family, and obstetric histories; medications; prior records and results; and available outside records were reviewed and updated in the EMR.  Pertinent findings were noted below.    Reason for Visit   Post Op - Vulvar abscess I&D    HPI   Perla Mallory is a 24 y.o. female  who presents status post I&D for R vulvar abscess on 2025. Her postoperative course was uncomplicated. She is s/p 48h IV Rocephin, doxy, flagyl in the hospital and was discharge home on a 14 day course of bactrim course, she currently has 3 days left.     Today, she reports persistent pain 6-7/10 which worsens to 8-9/10 with activities and wound care dressing changes. She is currently taking percocet 5mg every four hours which is increased from every 12 hours due to worsening pain since Monday. She reports that wound care came earlier this week for a dressing change in which they applied betadine to "dry it out". Wound care sees her every other week. Per patient wound care states wound looks good and no concerns for infection. Patient is not currently changing packing herself, she does apply gauze and tape over the top of the packing. She reports changing the guaze approximately 4 times per day. Also reports she is supposed to be receiving home health visit every other week alternating with wound care visit however home health has not come by.     She is currently having serosanguinous drainage from wound. States swelling surrounding wound has significantly regressed however reports new swelling to lower right labia which started about 4 days ago following packing change. She also endorses increased activity level which she thinks could have played a role in the increased swelling.  She is currently tolerating gentle activities such as walking around the house. Denies bathing, swimming in pool, sitting in hot tub.     Also reports " "possible yeast infection and was started on fluconazole per Dr. Rizvi yesterday.     She is also endorsing constipation, and reports she has only had 3 bowel movements over the last two weeks. Her last BM was 1-2 days ago. Admits to straining with first BM after discharge, denies straining since. Denies abdominal pain, nausea, vomiting. Reports she is taking colace twice daily. Reports no OTC medications. Reports good fluid intake.     Exam   /76   Ht 5' 3" (1.6 m)   Wt 61 kg (134 lb 7.7 oz)   LMP  (LMP Unknown)   BMI 23.82 kg/m²     Physical Exam  Constitutional:       General: She is not in acute distress.     Appearance: Normal appearance. She is normal weight. She is not ill-appearing, toxic-appearing or diaphoretic.     Genitourinary:    Labial Exam Comments:  Localized swelling R lower labia.    Vulva exam comments: Open wound on right vulva with packing in place draining serosanguinous fluid. No pus, abnormal discharge, or excess bleeding appreciated from wound. Appropriate post-op edema and tenderness. No significant surrounding erythema or warmth..      Vaginal exam comments: White discharge from vaginal canal.     HENT:      Head: Normocephalic.   Eyes:      Extraocular Movements: Extraocular movements intact.   Cardiovascular:      Rate and Rhythm: Normal rate.   Pulmonary:      Effort: Pulmonary effort is normal.   Musculoskeletal:         General: Normal range of motion.      Cervical back: Normal range of motion.   Neurological:      Mental Status: She is alert and oriented to person, place, and time.   Skin:     General: Skin is warm and dry.   Psychiatric:         Mood and Affect: Mood normal.         Behavior: Behavior normal.         Thought Content: Thought content normal.         Judgment: Judgment normal.   Vitals and nursing note reviewed. Exam conducted with a chaperone present.       Assessment and Plan   25 yo F who is presenting for post op visit- POD#16 s/p I&D for right vulvar " abscess    Vulvar abscess    Other orders  -     ketorolac (TORADOL) 10 mg tablet; Take 1 tablet (10 mg total) by mouth every 6 (six) hours as needed for Pain.  Dispense: 20 tablet; Refill: 0  -     psyllium husk, with sugar, 3.4 gram packet; Take 1 packet by mouth 2 (two) times daily. for 15 days  Dispense: 30 packet; Refill: 0    - Wound is healing well without concerns for infection today  - Appropriate post op tenderness and edema  - Suspect persistent pain is secondary to inflammation and edema   - Discussed multimodal pain control, will continue percocet as needed but will also plan to start Toradol for 5 days for inflammation  - Continue wound care visits for dressing changes  - Messaged  to coordinate home health visits as needed, will follow up with patient and her mom with further information when available  - Advised gentle activities such as short walk, stationary activities. Re-emphasized that there is no need for strict bed rest at this time.    - Meeting post operative milestones  - All questions answered at this time and encouraged patient to reach out with any other questions as needed    Constipation   - Patient with 3 bowel movements in three weeks  - Last BM was 1-2 days ago  - Suspect secondary to opioid pain control  - Encouraged decreased use of opioids as tolerated  - Encouraged continued use of colace   - Prescribed MetaMucel today to increase BMs    Yeast infection   - Pt with a history of recurrent yeast infection   - Noticed white, foul-smelling discharge starting this past weekend   - She messaged Dr. Rizvi who sent her diflucan.   - Continue full course per instructions     Follow up: 2-4 weeks or sooner as needed     Katie Lopez, MS3     I have seen and examined this patient with the medical student and agree with her assessment and plan.   Adela Griffith M.D.  Obstetrics and Gynecology  PGY-1

## 2025-07-17 NOTE — TELEPHONE ENCOUNTER
----- Message from Adela Gladis sent at 7/17/2025  3:07 PM CDT -----  Hi!     Can we please get this patient scheduled for follow up in 2-4 weeks to recheck her wound and follow up on her pain control?     Thank you!   SN

## 2025-07-17 NOTE — PLAN OF CARE
7/17/25 1600 Patient reporting difficulty managing wound care in between weekly home wound care visits - Veronica ([facility]) verifying benefits but plans to contact patient to schedule home visits starting 7/20 - MD galloway

## 2025-07-17 NOTE — PROGRESS NOTES
"Past medical, surgical, social, family, and obstetric histories; medications; prior records and results; and available outside records were reviewed and updated in the EMR.  Pertinent findings were noted below.    Reason for Visit   Post-op Evaluation    HPI   24 y.o. female     No LMP recorded (lmp unknown).    Patient presents today for post-op f/u and wound check of vulvar abscess. Patient was underwent I&D of large R labial/vulvar abscess on  and was hospitalized - for additional wound care. She was discharged home with a home health/wound care team for continued packing of the wound. She was discharged home on a 14 day course of Bactrim DS.  Today, patient reports ***    [facility]  [facility] *** [facility]}  [facility]: *** [facility]    Exam   /76   Ht 5' 3" (1.6 m)   Wt 61 kg (134 lb 7.7 oz)   LMP  (LMP Unknown)   BMI 23.82 kg/m²     OBGyn Exam  Assessment and Plan   There are no diagnoses linked to this encounter.  ***      "

## 2025-07-17 NOTE — PLAN OF CARE
YADIRA received a message from Dr. Griffith stating that patient was having issues with getting in touch with home wound care. YADIRA set patient up with [facility] program.     YADIRA called [facility] and spoke with Veronica [phone]. Patient was seen on 7/14 and scheduled to be seen 7/21.

## 2025-07-21 ENCOUNTER — TELEPHONE (OUTPATIENT)
Dept: OBSTETRICS AND GYNECOLOGY | Facility: CLINIC | Age: 25
End: 2025-07-21
Payer: COMMERCIAL

## 2025-07-21 NOTE — TELEPHONE ENCOUNTER
Copied from CRM #[id number]. Topic: General Inquiry - Patient Advice  >> Jul 17, 2025 12:15 PM Mor wrote:  .Name of Who is Calling:Julia                What is the request in detail: Rep calling for auth for a hospital stay and surgery. States she called a few times and haven't gotten a reply..Please call back to further assist.        REF# [id number]         Can the clinic reply by [facility]: No                What Number to Call Back [facility]: [phone]

## 2025-07-21 NOTE — TELEPHONE ENCOUNTER
Julia with insurance company.   Insurance company is needing to get note for authorization for surgery and hospital stay.     Surgery Auth# [id number]  Fax # [phone]    Inpatient Auth# [id number]  Fax # [phone]\      Will be faxing over notes that are needed for approval.

## 2025-07-31 ENCOUNTER — OFFICE VISIT (OUTPATIENT)
Dept: OBSTETRICS AND GYNECOLOGY | Facility: CLINIC | Age: 25
End: 2025-07-31
Payer: COMMERCIAL

## 2025-07-31 VITALS
SYSTOLIC BLOOD PRESSURE: 118 MMHG | HEIGHT: 63 IN | DIASTOLIC BLOOD PRESSURE: 70 MMHG | WEIGHT: 134.5 LBS | BODY MASS INDEX: 23.83 KG/M2

## 2025-07-31 DIAGNOSIS — N76.4 VULVAR ABSCESS: Primary | ICD-10-CM

## 2025-07-31 PROCEDURE — 1111F DSCHRG MED/CURRENT MED MERGE: CPT | Mod: CPTII,S$GLB,, | Performed by: STUDENT IN AN ORGANIZED HEALTH CARE EDUCATION/TRAINING PROGRAM

## 2025-07-31 PROCEDURE — 99213 OFFICE O/P EST LOW 20 MIN: CPT | Mod: S$GLB,,, | Performed by: STUDENT IN AN ORGANIZED HEALTH CARE EDUCATION/TRAINING PROGRAM

## 2025-07-31 PROCEDURE — 1159F MED LIST DOCD IN RCRD: CPT | Mod: CPTII,S$GLB,, | Performed by: STUDENT IN AN ORGANIZED HEALTH CARE EDUCATION/TRAINING PROGRAM

## 2025-07-31 PROCEDURE — 3074F SYST BP LT 130 MM HG: CPT | Mod: CPTII,S$GLB,, | Performed by: STUDENT IN AN ORGANIZED HEALTH CARE EDUCATION/TRAINING PROGRAM

## 2025-07-31 PROCEDURE — 3078F DIAST BP <80 MM HG: CPT | Mod: CPTII,S$GLB,, | Performed by: STUDENT IN AN ORGANIZED HEALTH CARE EDUCATION/TRAINING PROGRAM

## 2025-07-31 PROCEDURE — 99999 PR PBB SHADOW E&M-EST. PATIENT-LVL III: CPT | Mod: PBBFAC,,,

## 2025-07-31 PROCEDURE — 3008F BODY MASS INDEX DOCD: CPT | Mod: CPTII,S$GLB,, | Performed by: STUDENT IN AN ORGANIZED HEALTH CARE EDUCATION/TRAINING PROGRAM

## 2025-07-31 NOTE — PROGRESS NOTES
[facility]  Obstetrics and Gynecology      Past medical, surgical, social, family, and obstetric histories; medications; prior records and results; and available outside records were reviewed and updated in the EMR.  Pertinent findings were noted below.     Reason for Visit   Post Op - Vulvar abscess I&D     HPI   Perla Mallory is a 24 y.o. female  who presents status post I&D for R vulvar abscess on 2025. Her postoperative course was uncomplicated. She is s/p 48h IV Rocephin, doxy, flagyl in the hospital and a 14 day course of bactram.    Today she reports no pain. She will occassionally have some tenderness and has continued to take one pain medication in the morning from time to time but has otherwise stopped taking her prescribed medications. She has a dressing that she changes three times daily per her wound care appointments. She has been discharged from wound care otherwise. She is worried about ingrown hairs in the area of the wound. She would like to know what she can be doing to avoid ingrown hairs. She has no other questions or concerns today.      Exam   BP: (118)/(70)     Physical Exam  Constitutional:       General: She is not in acute distress.     Appearance: Normal appearance. She is normal weight.   HENT:      Head: Normocephalic and atraumatic.   Eyes:      Extraocular Movements: Extraocular movements intact.   Cardiovascular:      Rate and Rhythm: Normal rate.   Pulmonary:      Effort: Pulmonary effort is normal. No respiratory distress.   Genitourinary:     General: Normal vulva.      Comments: Scar formation on the right labia in the area of the wound. No erythema or exudate. No sign of wound dehiscence. Mild tenderness to palpation of the area. Image in media.    Musculoskeletal:         General: Normal range of motion.   Skin:     General: Skin is warm and dry.   Neurological:      Mental Status: She is alert and oriented to person, place, and time.    Psychiatric:         Mood and Affect: Mood normal.              Assessment and Plan   25 yo F who is presenting for post op visit- POD#16 s/p I&D for right vulvar abscess     Vulvar abscess  - Wound is healing well without concerns for infection today  - Appropriate post op tenderness, no erythema or exudate on exam (image in media)  - Wound care has signed off   - Counseled on cleaning of the area    Follow up: no follow up necessary, told to call if she had any further questions     Wiliam Hu MD  Obstetrics and Gynecology, PGY-1       Initial (On Arrival)

## 2025-08-01 NOTE — PROGRESS NOTES
I have reviewed the notes, assessments, and/or procedures performed by Dr. Hu, I concur with her/his documentation of Perla Mallory.      Juana Flanagan MD

## 2025-08-18 ENCOUNTER — PATIENT MESSAGE (OUTPATIENT)
Dept: OBSTETRICS AND GYNECOLOGY | Facility: CLINIC | Age: 25
End: 2025-08-18
Payer: COMMERCIAL

## 2025-08-20 ENCOUNTER — PATIENT MESSAGE (OUTPATIENT)
Dept: OBSTETRICS AND GYNECOLOGY | Facility: CLINIC | Age: 25
End: 2025-08-20
Payer: COMMERCIAL

## (undated) DEVICE — SOL IRR SOD CHL .9% POUR

## (undated) DEVICE — PENCIL ELECTROSURG HOLST W/BLD

## (undated) DEVICE — TIP YANKAUERS BULB NO VENT

## (undated) DEVICE — GLOVE SENSICARE PI SURG 6.5

## (undated) DEVICE — TUBING SUC UNIV W/CONN 12FT

## (undated) DEVICE — SOL POVIDONE PREP IODINE 4 OZ

## (undated) DEVICE — ELECTRODE NEEDLE 1IN

## (undated) DEVICE — SOL POVIDONE SCRUB IODINE 4 OZ

## (undated) DEVICE — Device

## (undated) DEVICE — SUT VICRYL 3-0 27 SH

## (undated) DEVICE — COUNT NDL FOAM MAGNET 40COUNT

## (undated) DEVICE — TRAY DRY SKIN SCRUB PREP

## (undated) DEVICE — ELECTRODE REM PLYHSV RETURN 9